# Patient Record
Sex: FEMALE | Race: WHITE | NOT HISPANIC OR LATINO | Employment: OTHER | URBAN - METROPOLITAN AREA
[De-identification: names, ages, dates, MRNs, and addresses within clinical notes are randomized per-mention and may not be internally consistent; named-entity substitution may affect disease eponyms.]

---

## 2023-05-01 DIAGNOSIS — B00.9 HERPES SIMPLEX: Primary | ICD-10-CM

## 2023-05-01 RX ORDER — VALACYCLOVIR HYDROCHLORIDE 500 MG/1
500 TABLET, FILM COATED ORAL 2 TIMES DAILY
Qty: 28 TABLET | Refills: 3 | Status: SHIPPED | OUTPATIENT
Start: 2023-05-01 | End: 2023-11-07 | Stop reason: SDUPTHER

## 2023-05-01 RX ORDER — VALACYCLOVIR HYDROCHLORIDE 500 MG/1
1 TABLET, FILM COATED ORAL 2 TIMES DAILY
COMMUNITY
Start: 2018-03-27 | End: 2023-05-01 | Stop reason: SDUPTHER

## 2023-05-12 RX ORDER — ATORVASTATIN CALCIUM 10 MG/1
1 TABLET, FILM COATED ORAL DAILY
COMMUNITY
Start: 2021-07-26 | End: 2023-06-19 | Stop reason: SDUPTHER

## 2023-05-12 RX ORDER — TRAZODONE HYDROCHLORIDE 100 MG/1
100 TABLET ORAL NIGHTLY
COMMUNITY
End: 2023-08-08

## 2023-05-12 RX ORDER — ONDANSETRON HYDROCHLORIDE 8 MG/1
8 TABLET, FILM COATED ORAL DAILY PRN
COMMUNITY
Start: 2023-02-24 | End: 2023-05-15 | Stop reason: ALTCHOICE

## 2023-05-12 RX ORDER — DOXYCYCLINE 50 MG/1
50 CAPSULE ORAL
COMMUNITY
Start: 2022-09-28 | End: 2023-05-15 | Stop reason: ALTCHOICE

## 2023-05-12 RX ORDER — ESCITALOPRAM OXALATE 10 MG/1
1 TABLET ORAL
COMMUNITY
Start: 2020-03-23 | End: 2023-05-15 | Stop reason: ALTCHOICE

## 2023-05-12 RX ORDER — BUPROPION HYDROCHLORIDE 300 MG/1
1 TABLET ORAL DAILY
COMMUNITY
Start: 2020-02-25 | End: 2023-10-16 | Stop reason: SDUPTHER

## 2023-05-12 RX ORDER — CHOLECALCIFEROL (VITAMIN D3) 50 MCG
1 TABLET ORAL DAILY
COMMUNITY
Start: 2018-06-28 | End: 2023-11-29 | Stop reason: WASHOUT

## 2023-05-12 RX ORDER — FLUOCINOLONE ACETONIDE 0.1 MG/ML
SOLUTION TOPICAL
COMMUNITY
Start: 2022-05-19 | End: 2023-09-22 | Stop reason: ALTCHOICE

## 2023-05-12 RX ORDER — KETOCONAZOLE 20 MG/ML
SHAMPOO, SUSPENSION TOPICAL
COMMUNITY
Start: 2022-05-19 | End: 2023-11-07 | Stop reason: WASHOUT

## 2023-05-15 ENCOUNTER — OFFICE VISIT (OUTPATIENT)
Dept: PRIMARY CARE | Facility: CLINIC | Age: 68
End: 2023-05-15
Payer: MEDICARE

## 2023-05-15 VITALS
DIASTOLIC BLOOD PRESSURE: 68 MMHG | RESPIRATION RATE: 18 BRPM | HEIGHT: 64 IN | WEIGHT: 149 LBS | BODY MASS INDEX: 25.44 KG/M2 | SYSTOLIC BLOOD PRESSURE: 110 MMHG | HEART RATE: 81 BPM | TEMPERATURE: 97.8 F | OXYGEN SATURATION: 97 %

## 2023-05-15 DIAGNOSIS — H61.22 IMPACTED CERUMEN OF LEFT EAR: Primary | ICD-10-CM

## 2023-05-15 PROCEDURE — 1159F MED LIST DOCD IN RCRD: CPT | Performed by: INTERNAL MEDICINE

## 2023-05-15 PROCEDURE — 99213 OFFICE O/P EST LOW 20 MIN: CPT | Performed by: INTERNAL MEDICINE

## 2023-05-15 PROCEDURE — 1036F TOBACCO NON-USER: CPT | Performed by: INTERNAL MEDICINE

## 2023-05-15 ASSESSMENT — PAIN SCALES - GENERAL: PAINLEVEL: 8

## 2023-06-14 RX ORDER — FLUOXETINE HYDROCHLORIDE 20 MG/1
2 CAPSULE ORAL DAILY
COMMUNITY
Start: 2006-03-10 | End: 2023-09-22 | Stop reason: ALTCHOICE

## 2023-06-19 ENCOUNTER — OFFICE VISIT (OUTPATIENT)
Dept: PRIMARY CARE | Facility: CLINIC | Age: 68
End: 2023-06-19
Payer: MEDICARE

## 2023-06-19 VITALS
OXYGEN SATURATION: 99 % | HEART RATE: 88 BPM | TEMPERATURE: 98 F | DIASTOLIC BLOOD PRESSURE: 70 MMHG | HEIGHT: 64 IN | RESPIRATION RATE: 16 BRPM | SYSTOLIC BLOOD PRESSURE: 150 MMHG | BODY MASS INDEX: 23.9 KG/M2 | WEIGHT: 140 LBS

## 2023-06-19 DIAGNOSIS — E78.2 MIXED HYPERLIPIDEMIA: Primary | ICD-10-CM

## 2023-06-19 DIAGNOSIS — M25.552 PAIN OF LEFT HIP: ICD-10-CM

## 2023-06-19 PROCEDURE — 99213 OFFICE O/P EST LOW 20 MIN: CPT | Performed by: INTERNAL MEDICINE

## 2023-06-19 PROCEDURE — 1036F TOBACCO NON-USER: CPT | Performed by: INTERNAL MEDICINE

## 2023-06-19 PROCEDURE — 1159F MED LIST DOCD IN RCRD: CPT | Performed by: INTERNAL MEDICINE

## 2023-06-19 RX ORDER — ATORVASTATIN CALCIUM 10 MG/1
10 TABLET, FILM COATED ORAL DAILY
Qty: 90 TABLET | Refills: 1 | Status: SHIPPED | OUTPATIENT
Start: 2023-06-19 | End: 2023-11-07 | Stop reason: SDUPTHER

## 2023-06-19 RX ORDER — ETODOLAC 200 MG/1
200 CAPSULE ORAL 2 TIMES DAILY
Qty: 60 CAPSULE | Refills: 11 | Status: SHIPPED | OUTPATIENT
Start: 2023-06-19 | End: 2023-11-30 | Stop reason: SDUPTHER

## 2023-06-19 ASSESSMENT — ENCOUNTER SYMPTOMS
OCCASIONAL FEELINGS OF UNSTEADINESS: 0
HIP PAIN: 1
LOSS OF SENSATION IN FEET: 0
DEPRESSION: 0

## 2023-06-19 ASSESSMENT — PAIN SCALES - GENERAL: PAINLEVEL: 4

## 2023-06-19 NOTE — PROGRESS NOTES
"Subjective   Patient ID: Gerda Buck is a 67 y.o. female who presents for Hip Pain (Left, lump left side).  In for left hip pain for the past 3 weeks.    Hip Pain       In for left hip pain for about 3 week.s.  Review of Systems   All other systems reviewed and are negative.      Objective   Physical Exam  /70   Pulse 88   Temp 36.7 °C (98 °F)   Resp 16   Ht 1.626 m (5' 4\")   Wt 63.5 kg (140 lb)   SpO2 99%   BMI 24.03 kg/m²         Assessment/Plan   Problem List Items Addressed This Visit    None  Visit Diagnoses       Mixed hyperlipidemia    -  Primary    Relevant Medications    atorvastatin (Lipitor) 10 mg tablet    Pain of left hip        Relevant Medications    etodolac (Lodine) 200 mg capsule    Other Relevant Orders    XR hip left 2 or 3 views          Left hip pain.  May need PT> Will follow.     "

## 2023-08-08 DIAGNOSIS — F51.01 PRIMARY INSOMNIA: Primary | ICD-10-CM

## 2023-08-08 RX ORDER — TRAZODONE HYDROCHLORIDE 100 MG/1
100 TABLET ORAL NIGHTLY
Qty: 90 TABLET | Refills: 1 | Status: SHIPPED | OUTPATIENT
Start: 2023-08-08 | End: 2023-11-07 | Stop reason: SDUPTHER

## 2023-09-19 ENCOUNTER — TELEPHONE (OUTPATIENT)
Dept: PRIMARY CARE | Facility: CLINIC | Age: 68
End: 2023-09-19
Payer: MEDICARE

## 2023-09-19 NOTE — TELEPHONE ENCOUNTER
Patient is currently in hospital , admitted yesterday evening and will be in the hospital for 24-48 more hours. She states she would like Kendy to give her a CB and explain what's going on in layman's terms because the providers there aren't helpful or really explaining anything. There is a colleague of Kendy who is currently treating her by the name of Swati Guadarrama.    [phone]

## 2023-09-21 ENCOUNTER — TELEPHONE (OUTPATIENT)
Dept: PRIMARY CARE | Facility: CLINIC | Age: 68
End: 2023-09-21
Payer: MEDICARE

## 2023-09-21 DIAGNOSIS — R45.86 MOOD CHANGE: Primary | ICD-10-CM

## 2023-09-21 RX ORDER — AMLODIPINE BESYLATE 10 MG/1
1 TABLET ORAL DAILY
COMMUNITY
Start: 2023-09-20 | End: 2023-11-07 | Stop reason: SDUPTHER

## 2023-09-21 RX ORDER — LORAZEPAM 0.5 MG/1
0.5 TABLET ORAL DAILY
Qty: 5 TABLET | Refills: 0 | Status: SHIPPED | OUTPATIENT
Start: 2023-09-21

## 2023-09-21 RX ORDER — LORAZEPAM 0.5 MG/1
0.5 TABLET ORAL DAILY
COMMUNITY
End: 2023-09-21 | Stop reason: SDUPTHER

## 2023-09-21 RX ORDER — METOPROLOL TARTRATE 50 MG/1
TABLET ORAL 2 TIMES DAILY
COMMUNITY
Start: 2023-09-20 | End: 2023-11-07 | Stop reason: SDUPTHER

## 2023-09-21 RX ORDER — ESCITALOPRAM OXALATE 10 MG/1
10 TABLET ORAL
COMMUNITY
End: 2023-10-16 | Stop reason: SDUPTHER

## 2023-09-21 RX ORDER — ACETAMINOPHEN 325 MG/1
TABLET ORAL EVERY 4 HOURS PRN
COMMUNITY
Start: 2023-09-20 | End: 2023-11-07 | Stop reason: WASHOUT

## 2023-09-21 NOTE — TELEPHONE ENCOUNTER
Patient called in asked for 5 Ativan to be sent to [facility], Advise? She also has an upcoming apt tomorrow at 9:30 am.

## 2023-09-22 ENCOUNTER — OFFICE VISIT (OUTPATIENT)
Dept: PRIMARY CARE | Facility: CLINIC | Age: 68
End: 2023-09-22
Payer: MEDICARE

## 2023-09-22 DIAGNOSIS — I49.8 OTHER CARDIAC ARRHYTHMIA: Primary | ICD-10-CM

## 2023-09-22 DIAGNOSIS — R94.31 ABNORMAL ELECTROCARDIOGRAPHY DURING EXERCISE STRESS TEST: ICD-10-CM

## 2023-09-22 PROBLEM — I49.9 ARRHYTHMIA: Status: ACTIVE | Noted: 2023-09-22

## 2023-09-22 PROCEDURE — 1125F AMNT PAIN NOTED PAIN PRSNT: CPT | Performed by: INTERNAL MEDICINE

## 2023-09-22 PROCEDURE — 99213 OFFICE O/P EST LOW 20 MIN: CPT | Performed by: INTERNAL MEDICINE

## 2023-09-22 PROCEDURE — 90662 IIV NO PRSV INCREASED AG IM: CPT | Performed by: INTERNAL MEDICINE

## 2023-09-22 PROCEDURE — G0008 ADMIN INFLUENZA VIRUS VAC: HCPCS | Performed by: INTERNAL MEDICINE

## 2023-09-22 PROCEDURE — 1159F MED LIST DOCD IN RCRD: CPT | Performed by: INTERNAL MEDICINE

## 2023-09-22 PROCEDURE — 1036F TOBACCO NON-USER: CPT | Performed by: INTERNAL MEDICINE

## 2023-09-22 ASSESSMENT — ENCOUNTER SYMPTOMS: DIZZINESS: 1

## 2023-09-22 NOTE — PROGRESS NOTES
Subjective   Patient ID: Gerda Buck is a 67 y.o. female who presents for Hospital Follow-up, Flu Vaccine, and Dizziness.  Dizziness      In for follow up for recent hospitalization  Review of Systems   Neurological:  Positive for dizziness.   All other systems reviewed and are negative.      Objective   Physical Exam  Constitutional:       General: She is not in acute distress.     Appearance: Normal appearance. She is normal weight. She is not ill-appearing, toxic-appearing or diaphoretic.   HENT:      Head: Normocephalic.      Right Ear: Tympanic membrane, ear canal and external ear normal.      Left Ear: Tympanic membrane, ear canal and external ear normal.      Nose: Nose normal.      Mouth/Throat:      Mouth: Mucous membranes are moist.      Pharynx: Oropharynx is clear.   Eyes:      Extraocular Movements: Extraocular movements intact.      Conjunctiva/sclera: Conjunctivae normal.      Pupils: Pupils are equal, round, and reactive to light.   Cardiovascular:      Rate and Rhythm: Normal rate.      Heart sounds: No murmur heard.     No friction rub. No gallop.   Pulmonary:      Effort: Pulmonary effort is normal. No respiratory distress.      Breath sounds: Normal breath sounds. No wheezing or rales.   Abdominal:      General: Abdomen is flat. Bowel sounds are normal. There is no distension.      Palpations: Abdomen is soft. There is no mass.      Tenderness: There is no abdominal tenderness. There is no guarding or rebound.      Hernia: No hernia is present.   Musculoskeletal:         General: No swelling, tenderness, deformity or signs of injury.      Cervical back: Normal range of motion.      Right lower leg: No edema.      Left lower leg: No edema.   Skin:     General: Skin is warm and dry.   Neurological:      General: No focal deficit present.      Mental Status: She is alert. Mental status is at baseline. She is disoriented.   Psychiatric:         Mood and Affect: Mood normal.         Behavior:  Behavior normal.       There were no vitals taken for this visit.        Assessment/Plan   Problem List Items Addressed This Visit       Arrhythmia - Primary    Relevant Medications    metoprolol tartrate (Lopressor) 50 mg tablet    amLODIPine (Norvasc) 10 mg tablet   Stress Echo  Will follow. Call if there are any    Issues.

## 2023-09-22 NOTE — PROGRESS NOTES
Patient is here to f/up from hospital for tachycardia, currently has a monitor on chest for 14 days to monitor pulse, Has some concerns   No Pain   No Rx refills needed  Received HD Flu vaccine within [facility] in office

## 2023-09-25 ENCOUNTER — PATIENT OUTREACH (OUTPATIENT)
Dept: CARE COORDINATION | Facility: CLINIC | Age: 68
End: 2023-09-25
Payer: MEDICARE

## 2023-09-25 NOTE — PROGRESS NOTES
Follow up KELSIE completed by in patient nursing.   Has follow up with Dr. Larry on 9/28/2023.   Will outreach to patient after visit.   Patient had Medicare.

## 2023-09-28 ENCOUNTER — APPOINTMENT (OUTPATIENT)
Dept: PRIMARY CARE | Facility: CLINIC | Age: 68
End: 2023-09-28
Payer: MEDICARE

## 2023-10-04 ENCOUNTER — DOCUMENTATION (OUTPATIENT)
Dept: ADMINISTRATIVE | Facility: CLINIC | Age: 68
End: 2023-10-04
Payer: MEDICARE

## 2023-10-13 ENCOUNTER — TELEPHONE (OUTPATIENT)
Dept: PRIMARY CARE | Facility: CLINIC | Age: 68
End: 2023-10-13
Payer: MEDICARE

## 2023-10-16 DIAGNOSIS — F32.9 MAJOR DEPRESSIVE DISORDER, REMISSION STATUS UNSPECIFIED, UNSPECIFIED WHETHER RECURRENT: ICD-10-CM

## 2023-10-16 DIAGNOSIS — F41.9 ANXIETY: ICD-10-CM

## 2023-10-16 RX ORDER — ESCITALOPRAM OXALATE 10 MG/1
10 TABLET ORAL
Qty: 30 TABLET | Refills: 0 | Status: SHIPPED | OUTPATIENT
Start: 2023-10-16 | End: 2023-11-07 | Stop reason: SDUPTHER

## 2023-10-16 RX ORDER — BUPROPION HYDROCHLORIDE 150 MG/1
150 TABLET ORAL EVERY MORNING
Qty: 30 TABLET | Refills: 0 | Status: SHIPPED | OUTPATIENT
Start: 2023-10-16 | End: 2023-11-07 | Stop reason: WASHOUT

## 2023-10-16 RX ORDER — BUPROPION HYDROCHLORIDE 300 MG/1
300 TABLET ORAL DAILY
Qty: 30 TABLET | Refills: 0 | Status: SHIPPED | OUTPATIENT
Start: 2023-10-16 | End: 2023-11-07 | Stop reason: SDUPTHER

## 2023-10-26 ENCOUNTER — HOSPITAL ENCOUNTER (OUTPATIENT)
Dept: CARDIOLOGY | Facility: HOSPITAL | Age: 68
Discharge: HOME | End: 2023-10-26
Payer: MEDICARE

## 2023-10-26 DIAGNOSIS — R94.31 ABNORMAL ELECTROCARDIOGRAPHY DURING EXERCISE STRESS TEST: ICD-10-CM

## 2023-10-26 DIAGNOSIS — I49.8 OTHER CARDIAC ARRHYTHMIA: ICD-10-CM

## 2023-10-26 PROCEDURE — 93018 CV STRESS TEST I&R ONLY: CPT | Performed by: INTERNAL MEDICINE

## 2023-10-26 PROCEDURE — 93016 CV STRESS TEST SUPVJ ONLY: CPT | Performed by: INTERNAL MEDICINE

## 2023-10-26 PROCEDURE — 93350 STRESS TTE ONLY: CPT | Performed by: INTERNAL MEDICINE

## 2023-10-26 PROCEDURE — 93017 CV STRESS TEST TRACING ONLY: CPT

## 2023-11-07 DIAGNOSIS — I49.8 OTHER CARDIAC ARRHYTHMIA: Primary | ICD-10-CM

## 2023-11-07 DIAGNOSIS — F41.9 ANXIETY: ICD-10-CM

## 2023-11-07 DIAGNOSIS — E78.2 MIXED HYPERLIPIDEMIA: ICD-10-CM

## 2023-11-07 DIAGNOSIS — F51.01 PRIMARY INSOMNIA: ICD-10-CM

## 2023-11-07 DIAGNOSIS — B00.9 HERPES SIMPLEX: ICD-10-CM

## 2023-11-07 DIAGNOSIS — F32.9 MAJOR DEPRESSIVE DISORDER, REMISSION STATUS UNSPECIFIED, UNSPECIFIED WHETHER RECURRENT: ICD-10-CM

## 2023-11-07 RX ORDER — TRAZODONE HYDROCHLORIDE 100 MG/1
100 TABLET ORAL NIGHTLY
Qty: 90 TABLET | Refills: 1 | Status: SHIPPED | OUTPATIENT
Start: 2023-11-07 | End: 2024-06-05

## 2023-11-07 RX ORDER — METOPROLOL TARTRATE 50 MG/1
50 TABLET ORAL 2 TIMES DAILY
Qty: 180 TABLET | Refills: 1 | Status: SHIPPED | OUTPATIENT
Start: 2023-11-07 | End: 2024-06-05

## 2023-11-07 RX ORDER — BUPROPION HYDROCHLORIDE 300 MG/1
300 TABLET ORAL DAILY
Qty: 90 TABLET | Refills: 1 | Status: SHIPPED | OUTPATIENT
Start: 2023-11-07 | End: 2024-06-05

## 2023-11-07 RX ORDER — VALACYCLOVIR HYDROCHLORIDE 500 MG/1
500 TABLET, FILM COATED ORAL 2 TIMES DAILY
Qty: 28 TABLET | Refills: 3 | Status: SHIPPED | OUTPATIENT
Start: 2023-11-07

## 2023-11-07 RX ORDER — AMLODIPINE BESYLATE 10 MG/1
10 TABLET ORAL DAILY
Qty: 90 TABLET | Refills: 1 | Status: SHIPPED | OUTPATIENT
Start: 2023-11-07 | End: 2024-06-05

## 2023-11-07 RX ORDER — ESCITALOPRAM OXALATE 10 MG/1
10 TABLET ORAL
Qty: 90 TABLET | Refills: 1 | Status: SHIPPED | OUTPATIENT
Start: 2023-11-07 | End: 2024-06-05

## 2023-11-07 RX ORDER — ATORVASTATIN CALCIUM 10 MG/1
10 TABLET, FILM COATED ORAL DAILY
Qty: 90 TABLET | Refills: 1 | Status: SHIPPED | OUTPATIENT
Start: 2023-11-07 | End: 2024-06-05

## 2023-11-23 ENCOUNTER — APPOINTMENT (OUTPATIENT)
Dept: ORTHOPEDIC SURGERY | Facility: CLINIC | Age: 68
End: 2023-11-23
Payer: MEDICARE

## 2023-11-29 ENCOUNTER — APPOINTMENT (OUTPATIENT)
Dept: ORTHOPEDIC SURGERY | Facility: HOSPITAL | Age: 68
End: 2023-11-29
Payer: MEDICARE

## 2023-11-29 ENCOUNTER — TELEPHONE (OUTPATIENT)
Dept: PRIMARY CARE | Facility: CLINIC | Age: 68
End: 2023-11-29
Payer: MEDICARE

## 2023-11-29 NOTE — TELEPHONE ENCOUNTER
Patient called in has neck and shoulder pains, this is a symptom of not taking the medication, tried to get in with chiropractor, tried OTC medication patches, asking for a few pain medications until she is seen 12/01/2023, Advise?

## 2023-11-30 DIAGNOSIS — M25.552 PAIN OF LEFT HIP: ICD-10-CM

## 2023-11-30 RX ORDER — ETODOLAC 200 MG/1
200 CAPSULE ORAL 2 TIMES DAILY
Qty: 7 CAPSULE | Refills: 1 | Status: SHIPPED | OUTPATIENT
Start: 2023-11-30 | End: 2023-12-01 | Stop reason: SDUPTHER

## 2023-12-01 ENCOUNTER — OFFICE VISIT (OUTPATIENT)
Dept: PRIMARY CARE | Facility: CLINIC | Age: 68
End: 2023-12-01
Payer: MEDICARE

## 2023-12-01 VITALS
TEMPERATURE: 97.3 F | RESPIRATION RATE: 16 BRPM | BODY MASS INDEX: 23.22 KG/M2 | DIASTOLIC BLOOD PRESSURE: 58 MMHG | WEIGHT: 136 LBS | SYSTOLIC BLOOD PRESSURE: 102 MMHG | HEART RATE: 61 BPM | OXYGEN SATURATION: 99 % | HEIGHT: 64 IN

## 2023-12-01 DIAGNOSIS — Z12.31 ENCOUNTER FOR SCREENING MAMMOGRAM FOR MALIGNANT NEOPLASM OF BREAST: ICD-10-CM

## 2023-12-01 DIAGNOSIS — M81.8 OTHER OSTEOPOROSIS WITHOUT CURRENT PATHOLOGICAL FRACTURE: ICD-10-CM

## 2023-12-01 DIAGNOSIS — M25.552 PAIN OF LEFT HIP: ICD-10-CM

## 2023-12-01 DIAGNOSIS — K58.9: ICD-10-CM

## 2023-12-01 DIAGNOSIS — Z00.00 WELL ADULT EXAM: Primary | ICD-10-CM

## 2023-12-01 PROCEDURE — 1159F MED LIST DOCD IN RCRD: CPT | Performed by: INTERNAL MEDICINE

## 2023-12-01 PROCEDURE — 1125F AMNT PAIN NOTED PAIN PRSNT: CPT | Performed by: INTERNAL MEDICINE

## 2023-12-01 PROCEDURE — 99214 OFFICE O/P EST MOD 30 MIN: CPT | Performed by: INTERNAL MEDICINE

## 2023-12-01 PROCEDURE — 1036F TOBACCO NON-USER: CPT | Performed by: INTERNAL MEDICINE

## 2023-12-01 ASSESSMENT — PAIN SCALES - GENERAL: PAINLEVEL: 2

## 2023-12-01 NOTE — PROGRESS NOTES
"Subjective   Patient ID: Gerda Buck is a 68 y.o. female who presents for Hospital Follow-up.  Patient is for follow up for htn and also hypermotility.        Review of Systems   All other systems reviewed and are negative.      Objective   Physical Exam  /58   Pulse 61   Temp 36.3 °C (97.3 °F)   Resp 16   Ht 1.626 m (5' 4\")   Wt 61.7 kg (136 lb)   SpO2 99%   BMI 23.34 kg/m²         Assessment/Plan   Problem List Items Addressed This Visit    None  Visit Diagnoses       Well adult exam    -  Primary    Relevant Orders    XR DEXA bone density    BI mammo bilateral screening tomosynthesis    Varicella Zoster Antibody, IgG    Other osteoporosis without current pathological fracture        Relevant Orders    XR DEXA bone density    Encounter for screening mammogram for malignant neoplasm of breast        Relevant Orders    BI mammo bilateral screening tomosynthesis    Hypermobile colon        Relevant Orders    Referral to Gastroenterology               "

## 2023-12-04 RX ORDER — ETODOLAC 200 MG/1
200 CAPSULE ORAL 2 TIMES DAILY
Qty: 14 CAPSULE | Refills: 1 | Status: SHIPPED | OUTPATIENT
Start: 2023-12-04 | End: 2023-12-18

## 2024-01-08 ENCOUNTER — TELEPHONE (OUTPATIENT)
Dept: PRIMARY CARE | Facility: CLINIC | Age: 69
End: 2024-01-08
Payer: MEDICARE

## 2024-01-08 DIAGNOSIS — M54.9 BACK PAIN, UNSPECIFIED BACK LOCATION, UNSPECIFIED BACK PAIN LATERALITY, UNSPECIFIED CHRONICITY: Primary | ICD-10-CM

## 2024-01-08 RX ORDER — ETODOLAC 200 MG/1
200 CAPSULE ORAL 2 TIMES DAILY
COMMUNITY
End: 2024-01-08 | Stop reason: SDUPTHER

## 2024-01-09 RX ORDER — ETODOLAC 200 MG/1
200 CAPSULE ORAL 2 TIMES DAILY
Qty: 14 CAPSULE | Refills: 0 | Status: SHIPPED | OUTPATIENT
Start: 2024-01-09

## 2024-01-24 ENCOUNTER — OFFICE VISIT (OUTPATIENT)
Dept: GASTROENTEROLOGY | Facility: CLINIC | Age: 69
End: 2024-01-24
Payer: MEDICARE

## 2024-01-24 VITALS — BODY MASS INDEX: 23.39 KG/M2 | WEIGHT: 137 LBS | HEIGHT: 64 IN | HEART RATE: 80 BPM

## 2024-01-24 DIAGNOSIS — K58.9: ICD-10-CM

## 2024-01-24 DIAGNOSIS — K64.8 OTHER HEMORRHOIDS: Primary | ICD-10-CM

## 2024-01-24 DIAGNOSIS — K62.89 RECTAL PAIN: ICD-10-CM

## 2024-01-24 DIAGNOSIS — K64.9 HEMORRHOIDS, UNSPECIFIED HEMORRHOID TYPE: ICD-10-CM

## 2024-01-24 DIAGNOSIS — R19.4 CHANGE IN BOWEL HABIT: ICD-10-CM

## 2024-01-24 PROCEDURE — 1125F AMNT PAIN NOTED PAIN PRSNT: CPT | Performed by: INTERNAL MEDICINE

## 2024-01-24 PROCEDURE — 1036F TOBACCO NON-USER: CPT | Performed by: INTERNAL MEDICINE

## 2024-01-24 PROCEDURE — 99204 OFFICE O/P NEW MOD 45 MIN: CPT | Performed by: INTERNAL MEDICINE

## 2024-01-24 RX ORDER — HYDROCORTISONE ACETATE 25 MG/1
25 SUPPOSITORY RECTAL 2 TIMES DAILY
Qty: 28 SUPPOSITORY | Refills: 0 | Status: SHIPPED | OUTPATIENT
Start: 2024-01-24 | End: 2024-02-07

## 2024-01-24 ASSESSMENT — ENCOUNTER SYMPTOMS: SHORTNESS OF BREATH: 0

## 2024-01-24 NOTE — PROGRESS NOTES
REASON FOR VISIT:  Hemorrhoids, change in bowel habits  PCP (requesting provider): Juan M Larry MD.    HPI:  Gerda Buck is a 68 y.o. female with a past medical history of HTN, HLD, and osteoporosis being evaluated for hemorrhoids and change in bowel habits.    The patient notes that starting around Ramy she has been struggling with post-prandial fecal urgency but then alternating constipation.  She notes her diet changes the color of her stool.  She has had some change in caliber of her stool. She also notes issues with hemorrhoids.  She has rectal pain. Rare bleeding. She has a BM every other day. If she does go daily then only small amount. She takes 2 fiber chewies per day. She also sounds like she has been taking Miralax.  Her last colonoscopy was in 2019. Weight is stable. No abdominal pain.    PSurgHx: No abdominal surgeries     FamHx: No GI malignancy    Prior Endoscopy:  -Colonoscopy (8/2019): Good prep, medium sized IH, otherwise normal colon.  -Colonoscopy (4/2009): Good prep, normal colon.    PAST MEDICAL HISTORY  Past Medical History:   Diagnosis Date    Other conditions influencing health status     Arthritis    Personal history of other infectious and parasitic diseases     H/O cold sores       PAST SURGICAL HISTORY  Past Surgical History:   Procedure Laterality Date    ANKLE SURGERY  12/27/2013    Ankle Surgery    FOOT SURGERY  12/27/2013    Foot Surgery       FAMILY HISTORY  No family history on file.    SOCIAL HISTORY   reports that she has never smoked. She has never used smokeless tobacco. She reports current alcohol use. No history on file for drug use.    REVIEW OF SYSTEMS  Review of Systems   Respiratory:  Negative for shortness of breath.    Cardiovascular:  Negative for chest pain.   All other systems reviewed and are negative.    A 10+ point review of systems was otherwise negative except as noted and per HPI.    ALLERGIES  Allergies   Allergen Reactions    Other Other        MEDICATIONS  Current Outpatient Medications   Medication Instructions    amLODIPine (NORVASC) 10 mg, oral, Daily    atorvastatin (LIPITOR) 10 mg, oral, Daily    buPROPion XL (WELLBUTRIN XL) 300 mg, oral, Daily    escitalopram (LEXAPRO) 10 mg, oral, Daily RT    etodolac (LODINE) 200 mg, oral, 2 times daily    LORazepam (ATIVAN) 0.5 mg, oral, Daily    metoprolol tartrate (LOPRESSOR) 50 mg, oral, 2 times daily    traZODone (DESYREL) 100 mg, oral, Nightly    valACYclovir (VALTREX) 500 mg, oral, 2 times daily       VITALS  There were no vitals filed for this visit.   There is no height or weight on file to calculate BMI.    PHYSICAL EXAM  CONSTITUTIONAL: NAD, appears stated age  EYES: anicteric sclera, sclera clear  HEAD: normocephalic, atraumatic   NECK: supple   PULMONARY: CTAB  CARDIOVASCULAR: RRR, no M/R/G appreciated   ABDOMEN: soft, NTND, +BS, no rebound or guarding   MUSCULOSKELETAL: no edema  SKIN: no jaundice   PSYCHIATRIC: AOx3, appropriate insight and judgement    LABS  WBC (x10E9/L)   Date Value   09/20/2023 5.9   09/18/2023 8.5   05/18/2022 3.6 (L)     Hemoglobin (g/dL)   Date Value   09/20/2023 12.0   09/18/2023 11.7 (L)   05/18/2022 12.6     Platelets (x10E9/L)   Date Value   09/20/2023 241   09/18/2023 304   05/18/2022 234     Sodium (mmol/L)   Date Value   09/20/2023 132 (L)   09/19/2023 132 (L)   09/18/2023 132 (L)     Potassium (mmol/L)   Date Value   09/20/2023 3.3 (L)   09/19/2023 3.6   09/18/2023 3.4 (L)     Chloride (mmol/L)   Date Value   09/20/2023 98   09/19/2023 95 (L)   09/18/2023 95 (L)     Bicarbonate (mmol/L)   Date Value   09/20/2023 23   09/19/2023 24   09/18/2023 20 (L)     Urea Nitrogen (mg/dL)   Date Value   09/20/2023 9   09/19/2023 8   09/18/2023 15     Creatinine (mg/dL)   Date Value   09/20/2023 0.70   09/19/2023 0.61   09/18/2023 0.57     Calcium (mg/dL)   Date Value   09/20/2023 8.5 (L)   09/19/2023 9.1   09/18/2023 8.8     Total Protein (g/dL)   Date Value   09/18/2023 6.4  "  04/01/2019 6.9     Total Bilirubin (mg/dL)   Date Value   09/18/2023 1.7 (H)   04/01/2019 1.0     Alkaline Phosphatase (U/L)   Date Value   09/18/2023 86   04/01/2019 80     ALT (SGPT) (U/L)   Date Value   09/18/2023 24   05/18/2022 15   07/20/2021 12     AST (U/L)   Date Value   09/18/2023 44 (H)   04/01/2019 21     Glucose (mg/dL)   Date Value   09/20/2023 125 (H)   09/19/2023 98   09/18/2023 146 (H)     No results found for: \"LIPASE\", \"CRP\"    ASSESSMENT/PLAN  Gerda Buck is a 68 y.o. female with a past medical history of HTN, HLD, and osteoporosis being evaluated for change in bowel habits and intermittent issues with hemorrhoids. Colonoscopy showed medium sized hemorrhoids (8/2019). Patient with change in bowel habits with some fecal urgency on occasion but then also constipation. She has been taking Miralax and I think a better option would be Metamucil to bulk and even out her bowel habits.  We will also plan a course of steroid suppositories for her hemorrhoids.  If there is no improvement in the next few weeks, then we will plan an updated colonoscopy.    -Use Hydrocortisone suppository 25 mg BID x 14 days  -Stop Miralax and use Metamucil once daily and increase to BID after 1 week PRN  -If no improvement with the above, then advised the patient to call the office and schedule a colonoscopy     Follow-up in the office PRN.    Signature: Doug Nguyen MD  "

## 2024-01-24 NOTE — PATIENT INSTRUCTIONS
Stop Miralax and the gummies.    Use steroid suppository twice daily for 14 days to help with the hemorrhoids.    Use Metamucil powder 1 heaping tablespoonful mixed with 8 ounces of juice or water once daily for 1 week.  Increase to twice daily thereafter as needed.  If no improvement in 3-4 weeks, then call the office and we will schedule a colonoscopy.    Follow-up in the office as needed.

## 2024-02-16 ENCOUNTER — TELEPHONE (OUTPATIENT)
Dept: GASTROENTEROLOGY | Facility: CLINIC | Age: 69
End: 2024-02-16
Payer: MEDICARE

## 2024-02-16 DIAGNOSIS — K64.8 OTHER HEMORRHOIDS: Primary | ICD-10-CM

## 2024-02-16 RX ORDER — HYDROCORTISONE 25 MG/G
CREAM TOPICAL 2 TIMES DAILY
Qty: 23 G | Refills: 0 | Status: SHIPPED | OUTPATIENT
Start: 2024-02-16 | End: 2024-02-22 | Stop reason: SDUPTHER

## 2024-02-16 NOTE — TELEPHONE ENCOUNTER
Gerda Newberry called stating her hemorrhoids are still bleeding. She would like to know if something can be called in for her?     Please advise     Thank You

## 2024-02-22 DIAGNOSIS — K64.8 OTHER HEMORRHOIDS: ICD-10-CM

## 2024-02-22 RX ORDER — HYDROCORTISONE 25 MG/G
CREAM TOPICAL 2 TIMES DAILY
Qty: 30 G | Refills: 1 | Status: SHIPPED | OUTPATIENT
Start: 2024-02-22 | End: 2024-03-07

## 2024-02-27 ENCOUNTER — TELEPHONE (OUTPATIENT)
Dept: GASTROENTEROLOGY | Facility: CLINIC | Age: 69
End: 2024-02-27
Payer: MEDICARE

## 2024-02-27 DIAGNOSIS — R19.4 CHANGE IN BOWEL HABITS: Primary | ICD-10-CM

## 2024-02-27 DIAGNOSIS — R19.4 CHANGE IN STOOL HABITS: ICD-10-CM

## 2024-02-27 RX ORDER — POLYETHYLENE GLYCOL 3350, SODIUM SULFATE ANHYDROUS, SODIUM BICARBONATE, SODIUM CHLORIDE, POTASSIUM CHLORIDE 236; 22.74; 6.74; 5.86; 2.97 G/4L; G/4L; G/4L; G/4L; G/4L
4 POWDER, FOR SOLUTION ORAL ONCE
Qty: 4000 ML | Refills: 0 | Status: SHIPPED | OUTPATIENT
Start: 2024-02-27 | End: 2024-02-27

## 2024-02-28 RX ORDER — POLYETHYLENE GLYCOL 3350, SODIUM SULFATE ANHYDROUS, SODIUM BICARBONATE, SODIUM CHLORIDE, POTASSIUM CHLORIDE 236; 22.74; 6.74; 5.86; 2.97 G/4L; G/4L; G/4L; G/4L; G/4L
4000 POWDER, FOR SOLUTION ORAL ONCE
Qty: 4000 ML | Refills: 0 | Status: SHIPPED | OUTPATIENT
Start: 2024-02-28 | End: 2024-02-28

## 2024-03-12 ENCOUNTER — OFFICE VISIT (OUTPATIENT)
Dept: GASTROENTEROLOGY | Facility: EXTERNAL LOCATION | Age: 69
End: 2024-03-12
Payer: MEDICARE

## 2024-03-12 DIAGNOSIS — R19.4 CHANGE IN BOWEL HABITS: ICD-10-CM

## 2024-03-12 DIAGNOSIS — K57.30 DIVERTICULOSIS OF LARGE INTESTINE WITHOUT DIVERTICULITIS: ICD-10-CM

## 2024-03-12 DIAGNOSIS — K62.5 HEMORRHAGE OF ANUS AND RECTUM: Primary | ICD-10-CM

## 2024-03-12 DIAGNOSIS — K64.4 RESIDUAL HEMORRHOIDAL SKIN TAGS: ICD-10-CM

## 2024-03-12 PROCEDURE — 45378 DIAGNOSTIC COLONOSCOPY: CPT | Performed by: INTERNAL MEDICINE

## 2024-03-12 PROCEDURE — 1036F TOBACCO NON-USER: CPT | Performed by: INTERNAL MEDICINE

## 2024-03-12 PROCEDURE — 1125F AMNT PAIN NOTED PAIN PRSNT: CPT | Performed by: INTERNAL MEDICINE

## 2024-03-15 ENCOUNTER — APPOINTMENT (OUTPATIENT)
Dept: SURGERY | Facility: CLINIC | Age: 69
End: 2024-03-15
Payer: MEDICARE

## 2024-06-05 DIAGNOSIS — F51.01 PRIMARY INSOMNIA: ICD-10-CM

## 2024-06-05 DIAGNOSIS — I49.8 OTHER CARDIAC ARRHYTHMIA: ICD-10-CM

## 2024-06-05 DIAGNOSIS — F32.9 MAJOR DEPRESSIVE DISORDER, REMISSION STATUS UNSPECIFIED, UNSPECIFIED WHETHER RECURRENT: ICD-10-CM

## 2024-06-05 DIAGNOSIS — E78.2 MIXED HYPERLIPIDEMIA: ICD-10-CM

## 2024-06-05 DIAGNOSIS — F41.9 ANXIETY: ICD-10-CM

## 2024-06-05 RX ORDER — ESCITALOPRAM OXALATE 10 MG/1
10 TABLET ORAL
Qty: 90 TABLET | Refills: 1 | Status: SHIPPED | OUTPATIENT
Start: 2024-06-05 | End: 2024-12-02

## 2024-06-05 RX ORDER — ATORVASTATIN CALCIUM 10 MG/1
10 TABLET, FILM COATED ORAL DAILY
Qty: 90 TABLET | Refills: 1 | Status: SHIPPED | OUTPATIENT
Start: 2024-06-05

## 2024-06-05 RX ORDER — TRAZODONE HYDROCHLORIDE 100 MG/1
100 TABLET ORAL NIGHTLY
Qty: 90 TABLET | Refills: 1 | Status: SHIPPED | OUTPATIENT
Start: 2024-06-05

## 2024-06-05 RX ORDER — BUPROPION HYDROCHLORIDE 300 MG/1
300 TABLET ORAL DAILY
Qty: 90 TABLET | Refills: 1 | Status: SHIPPED | OUTPATIENT
Start: 2024-06-05 | End: 2024-12-02

## 2024-06-05 RX ORDER — METOPROLOL TARTRATE 50 MG/1
50 TABLET ORAL 2 TIMES DAILY
Qty: 180 TABLET | Refills: 1 | Status: SHIPPED | OUTPATIENT
Start: 2024-06-05 | End: 2024-12-02

## 2024-06-05 RX ORDER — AMLODIPINE BESYLATE 10 MG/1
10 TABLET ORAL DAILY
Qty: 90 TABLET | Refills: 1 | Status: SHIPPED | OUTPATIENT
Start: 2024-06-05 | End: 2024-12-02

## 2024-08-23 ENCOUNTER — OFFICE VISIT (OUTPATIENT)
Dept: SURGERY | Facility: CLINIC | Age: 69
End: 2024-08-23
Payer: MEDICARE

## 2024-08-23 VITALS
BODY MASS INDEX: 23 KG/M2 | TEMPERATURE: 97.3 F | WEIGHT: 134 LBS | DIASTOLIC BLOOD PRESSURE: 68 MMHG | HEART RATE: 80 BPM | SYSTOLIC BLOOD PRESSURE: 104 MMHG

## 2024-08-23 DIAGNOSIS — K64.8 INFLAMED INTERNAL HEMORRHOID: Primary | ICD-10-CM

## 2024-08-23 DIAGNOSIS — K64.4 ANAL SKIN TAG: ICD-10-CM

## 2024-08-23 PROCEDURE — 46600 DIAGNOSTIC ANOSCOPY SPX: CPT | Performed by: NURSE PRACTITIONER

## 2024-08-23 PROCEDURE — 99213 OFFICE O/P EST LOW 20 MIN: CPT | Performed by: NURSE PRACTITIONER

## 2024-08-23 RX ORDER — HYDROCORTISONE ACETATE 25 MG/1
25 SUPPOSITORY RECTAL 2 TIMES DAILY
Qty: 28 SUPPOSITORY | Refills: 0 | Status: SHIPPED | OUTPATIENT
Start: 2024-08-23 | End: 2024-09-06

## 2024-08-23 ASSESSMENT — PAIN SCALES - GENERAL: PAINLEVEL: 4

## 2024-08-23 NOTE — PROGRESS NOTES
History Of Present Illness  Gerda Buck is a 68 y.o. female presenting with an external hemorrhoid.     She has an external hemorrhoid that causes a lot of pain with sitting.  It will bleed off and on.      She has been taking a fiber supplement and will have 1-2  soft-hard BM's every day with straining.  She can sit long on the toilet to have a BM.  No c/o any accidents or leakage of stool.      Colonoscopy 3/2024    No hx of any perianal surgeries.  No hx of any vaginal births.        Past Medical History  She has a past medical history of Other conditions influencing health status and Personal history of other infectious and parasitic diseases.    Surgical History  She has a past surgical history that includes Foot surgery (12/27/2013) and Ankle surgery (12/27/2013).     Social History  She reports that she has never smoked. She has never used smokeless tobacco. She reports current alcohol use. No history on file for drug use.    Family History  No family history on file.     Allergies  Other    Review of Systems   All other systems reviewed and are negative.       Physical Exam  Constitutional:       Appearance: Normal appearance.   HENT:      Head: Normocephalic and atraumatic.   Pulmonary:      Effort: Pulmonary effort is normal.   Musculoskeletal:         General: Normal range of motion.   Skin:     General: Skin is warm and dry.   Neurological:      General: No focal deficit present.      Mental Status: She is alert and oriented to person, place, and time.   Psychiatric:         Mood and Affect: Mood normal.         Behavior: Behavior normal.       Anoscopy    Date/Time: 8/23/2024 9:30 AM    Performed by: CHAPITO Rosario  Authorized by: CHAPITO Rosario    Consent:     Consent obtained:  Verbal    Consent given by:  Patient    Risks, benefits, and alternatives were discussed: yes    Universal protocol:     Procedure explained and questions answered to patient or proxy's  satisfaction: yes      Patient identity confirmed:  Verbally with patient  Post-procedure details:     Procedure completion:  Tolerated  Comments:      She has a large anal skin tag in the anterior midline.  No pain or active bleeding.  Good tone on MAAME.  On anoscopy, looking in 360 degrees, she has irritation of the internal hemorrhoids more in the anterior midline.         Last Recorded Vitals  /68   Pulse 80   Temp 36.3 °C (97.3 °F)   Wt 60.8 kg (134 lb)        Assessment/Plan   Gerda has inflamed internal hemorrhoids and a large external anal skin tag in the anterior midline.  She will start taking Hydrocortisone suppositories BID for 2 weeks.  She will continue to increase her fiber and water intake.  We talked about surgical removal of the external hemorrhoid in the near future and she would like to do that next year.  I will have her see Dr. Guy in December for a possible surgery in the new year.  She will call with any issues.       Sis Veliz, APRN-CNP

## 2024-08-24 PROBLEM — K64.8 INFLAMED INTERNAL HEMORRHOID: Status: ACTIVE | Noted: 2024-08-24

## 2024-08-24 PROBLEM — K64.4 ANAL SKIN TAG: Status: ACTIVE | Noted: 2024-08-24

## 2024-08-29 DIAGNOSIS — K64.8 INFLAMED INTERNAL HEMORRHOID: Primary | ICD-10-CM

## 2024-08-29 RX ORDER — HYDROCORTISONE ACETATE 25 MG/1
25 SUPPOSITORY RECTAL 2 TIMES DAILY
Qty: 28 SUPPOSITORY | Refills: 0 | Status: SHIPPED | OUTPATIENT
Start: 2024-08-29 | End: 2024-09-12

## 2024-09-13 DIAGNOSIS — K64.8 INFLAMED INTERNAL HEMORRHOID: Primary | ICD-10-CM

## 2024-09-13 RX ORDER — HYDROCORTISONE ACETATE 25 MG/1
25 SUPPOSITORY RECTAL 2 TIMES DAILY
Qty: 28 SUPPOSITORY | Refills: 0 | Status: SHIPPED | OUTPATIENT
Start: 2024-09-13 | End: 2024-09-27

## 2024-09-13 RX ORDER — IBUPROFEN 800 MG/1
800 TABLET ORAL EVERY 8 HOURS PRN
Qty: 30 TABLET | Refills: 0 | Status: SHIPPED | OUTPATIENT
Start: 2024-09-13 | End: 2024-09-23

## 2024-09-13 NOTE — PROGRESS NOTES
She is still having hemorrhoidal pain but the hydrocortisone suppositories are helping.  She will continue to use the suppositories BID for 1-2 weeks and will use Motrin prn for any discomfort.

## 2024-09-16 DIAGNOSIS — B00.9 HERPES SIMPLEX: ICD-10-CM

## 2024-09-17 RX ORDER — VALACYCLOVIR HYDROCHLORIDE 500 MG/1
500 TABLET, FILM COATED ORAL 2 TIMES DAILY
Qty: 28 TABLET | Refills: 6 | Status: SHIPPED | OUTPATIENT
Start: 2024-09-17

## 2024-10-07 ENCOUNTER — HOSPITAL ENCOUNTER (OUTPATIENT)
Dept: RADIOLOGY | Facility: CLINIC | Age: 69
End: 2024-10-07
Payer: MEDICARE

## 2024-11-06 NOTE — PROGRESS NOTES
No chief complaint on file.      History Of Present Illness  Gerda Buck is a 69 y.o. female presenting with hemorrhoids.    Subjective   Gerda Buck was referred by Sis Veliz CNP  for evaluation of pain. She was seen by Sis 8/24 and had inflamed internal hemorrhoids and a large external anal skin tag. She was given hydrocortisone suppositories and presents today to discuss removal of the skin tag.     She has pain with defecation. She takes tylenol and uses hydrocortisone for the pain.     Pain: Yes - even with sitting, with Bms   Protruding Tissue: Yes  Bleeding: Yes - on TP     Bowel habits:  Moves bowels daily  Stool is soft with fiber; spends 5-6 minutes on the toilet to have a BM  Has blood on TP.     Dietary history:   64oz water; eats some fruit/veg/whole grains. At least 2+ servings per day.       Colonoscopy 3/2024 ([facility]): Hemorrhoids on perianal exam. The examined portion of the ileum was normal. Mild diverticulosis in the sigmoid colon. Medium sized external hemorrhoids. The exam was otherwise normal. Repeat in 10 years.     Non-smoker/No ETOH/No Illicit drug use  PMH: HTN, HLD, osteoporosis  PSH:  Allergies:   No family history of CRC or IBD  Employment:       Past Medical History  Past Medical History:   Diagnosis Date    Other conditions influencing health status     Arthritis    Personal history of other infectious and parasitic diseases     H/O cold sores       Surgical History  Past Surgical History:   Procedure Laterality Date    ANKLE SURGERY  12/27/2013    Ankle Surgery    FOOT SURGERY  12/27/2013    Foot Surgery        Social History  She reports that she has never smoked. She has never used smokeless tobacco. She reports current alcohol use. No history on file for drug use.    Family History  No family history on file.     Allergies  Other    Home Medications  Prior to Admission medications    Medication Sig Start Date End Date Taking? Authorizing Provider    amLODIPine (Norvasc) 10 mg tablet Take 1 tablet (10 mg) by mouth once daily. 6/5/24 12/2/24  Juan M Larry MD   atorvastatin (Lipitor) 10 mg tablet Take 1 tablet (10 mg) by mouth once daily. 6/5/24   Juan M Larry MD   buPROPion XL (Wellbutrin XL) 300 mg 24 hr tablet Take 1 tablet (300 mg) by mouth once daily. 6/5/24 12/2/24  Juan M Larry MD   escitalopram (Lexapro) 10 mg tablet Take 1 tablet (10 mg) by mouth once daily. 6/5/24 12/2/24  Juan M Larry MD   etodolac (Lodine) 200 mg capsule Take 1 capsule (200 mg) by mouth 2 times a day. 1/9/24   Juan M Larry MD   hydrocortisone (Anusol-HC) 2.5 % rectal cream Insert into the rectum 2 times a day for 14 days. 2/22/24 3/7/24  Doug Nguyen MD   LORazepam (Ativan) 0.5 mg tablet Take 1 tablet (0.5 mg) by mouth once daily. 9/21/23   Juan M Larry MD   metoprolol tartrate (Lopressor) 50 mg tablet Take 1 tablet by mouth 2 times a day. 6/5/24 12/2/24  Juan M Larry MD   traZODone (Desyrel) 100 mg tablet Take 1 tablet (100 mg) by mouth once daily at bedtime. 6/5/24   Juan M Larry MD   valACYclovir (Valtrex) 500 mg tablet Take 1 tablet (500 mg) by mouth 2 times a day. 9/17/24   Juan M Larry MD       Review of Systems     Physical Exam    Perineal/Rectal Exam  Perineum: Anterior midline hemorrhoidal skin tag   MAAME: WNL  Tone: WNL    Anoscopy    Date/Time: 11/8/2024 9:56 AM    Performed by: Divina Solorzano MD  Authorized by: Divina Solorzano MD    Consent:     Consent obtained:  Verbal    Consent given by:  Patient    Risks, benefits, and alternatives were discussed: yes      Risks discussed:  Bleeding and pain    Alternatives discussed:  No treatment  Universal protocol:     Procedure explained and questions answered to patient or proxy's satisfaction: yes      Patient identity confirmed:  Verbally with patient  Indications:     Indications: rectal pain    Procedure details:     Internal hemorrhoids: yes    Post-procedure  details:     Procedure completion:  Tolerated well, no immediate complications  Mildly inflamed internal hemorrhoids, not enlarged. Anterior prolapsing component attached to anterior skin tag.   Levator ani tenderness, particularly on R  Normal tone/good coordination.        Last Recorded Vitals  There were no vitals taken for this visit.    Relevant Results          Assessment/Plan   The diagnosis, pathophysiology, treatment and alternatives were discussed in detail. The patient is a candidate for operative hemorrhoidectomy to address the skin tag/prolapsing tissue. I discussed the procedure, options, preparation, perioperative course, risks and possible outcomes in detail. Specific risks discussed included, but were not limited to, bleeding, infection, wound separation, thrombosis, fistula, sphincter injury and incontinence, pain, spasm, constipation, urinary retention, and the general risks of anesthesia. We discussed the need for maintaining normal bowel habits to prevent fecal impaction in the short term, and then to prevent recurrence in the long term.   Periop preparation, perioperative course, need for a ride home, pain management, recovery and return to work/full activity were discussed. All questions were answered. She is going to think about it and call  With regards to levator ani tenderness, The anatomy, pathophysiology, treatment goals and alternatives, risks and benefits were discussed in detail.  I explained that this is a functional disorder and that treatment is directed at the symptoms.   Options include Nifedipine ointment, heat, anti-spasmodic medications, PT/pelvic floor massage, local injection (steroids, analgesics, botox, etc, per pain management). Patient education materials were provided. I recommend nifedipine, heat, and PT.

## 2024-11-08 ENCOUNTER — OFFICE VISIT (OUTPATIENT)
Dept: SURGERY | Facility: CLINIC | Age: 69
End: 2024-11-08
Payer: MEDICARE

## 2024-11-08 VITALS — DIASTOLIC BLOOD PRESSURE: 64 MMHG | HEART RATE: 68 BPM | SYSTOLIC BLOOD PRESSURE: 103 MMHG

## 2024-11-08 DIAGNOSIS — K62.89 ANAL PAIN: ICD-10-CM

## 2024-11-08 PROCEDURE — 46600 DIAGNOSTIC ANOSCOPY SPX: CPT | Performed by: SURGERY

## 2024-11-08 PROCEDURE — 99215 OFFICE O/P EST HI 40 MIN: CPT | Mod: 24 | Performed by: SURGERY

## 2024-11-08 ASSESSMENT — ENCOUNTER SYMPTOMS: RECTAL PAIN: 1

## 2025-01-13 ENCOUNTER — APPOINTMENT (OUTPATIENT)
Dept: SURGERY | Facility: CLINIC | Age: 70
End: 2025-01-13
Payer: MEDICARE

## 2025-02-02 ENCOUNTER — APPOINTMENT (OUTPATIENT)
Dept: RADIOLOGY | Facility: HOSPITAL | Age: 70
End: 2025-02-02
Payer: MEDICARE

## 2025-02-02 ENCOUNTER — APPOINTMENT (OUTPATIENT)
Dept: CARDIOLOGY | Facility: HOSPITAL | Age: 70
End: 2025-02-02
Payer: MEDICARE

## 2025-02-02 ENCOUNTER — HOSPITAL ENCOUNTER (OUTPATIENT)
Facility: HOSPITAL | Age: 70
Setting detail: OBSERVATION
End: 2025-02-02
Attending: EMERGENCY MEDICINE | Admitting: INTERNAL MEDICINE
Payer: MEDICARE

## 2025-02-02 VITALS
HEIGHT: 64 IN | RESPIRATION RATE: 16 BRPM | TEMPERATURE: 98.4 F | DIASTOLIC BLOOD PRESSURE: 59 MMHG | WEIGHT: 135 LBS | BODY MASS INDEX: 23.05 KG/M2 | HEART RATE: 94 BPM | OXYGEN SATURATION: 97 % | SYSTOLIC BLOOD PRESSURE: 132 MMHG

## 2025-02-02 DIAGNOSIS — E87.20 METABOLIC ACIDOSIS: Primary | ICD-10-CM

## 2025-02-02 DIAGNOSIS — R55 NEAR SYNCOPE: ICD-10-CM

## 2025-02-02 PROBLEM — E55.9 VITAMIN D DEFICIENCY: Status: ACTIVE | Noted: 2025-02-02

## 2025-02-02 PROBLEM — K64.8 INFLAMED INTERNAL HEMORRHOID: Status: RESOLVED | Noted: 2024-08-24 | Resolved: 2025-02-02

## 2025-02-02 PROBLEM — G25.71 AKATHISIA: Status: ACTIVE | Noted: 2025-02-02

## 2025-02-02 PROBLEM — K64.4 ANAL SKIN TAG: Status: RESOLVED | Noted: 2024-08-24 | Resolved: 2025-02-02

## 2025-02-02 PROBLEM — F33.8 SEASONAL AFFECTIVE DISORDER (CMS-HCC): Status: ACTIVE | Noted: 2025-02-02

## 2025-02-02 PROBLEM — Z00.00 WELL ADULT EXAM: Status: RESOLVED | Noted: 2023-12-01 | Resolved: 2025-02-02

## 2025-02-02 PROBLEM — K58.9: Status: RESOLVED | Noted: 2023-12-01 | Resolved: 2025-02-02

## 2025-02-02 PROBLEM — I49.9 ARRHYTHMIA: Status: RESOLVED | Noted: 2023-09-22 | Resolved: 2025-02-02

## 2025-02-02 LAB
ALBUMIN SERPL BCP-MCNC: 3.8 G/DL (ref 3.4–5)
ALBUMIN SERPL BCP-MCNC: 4.4 G/DL (ref 3.4–5)
ALP SERPL-CCNC: 101 U/L (ref 33–136)
ALP SERPL-CCNC: 87 U/L (ref 33–136)
ALT SERPL W P-5'-P-CCNC: 27 U/L (ref 7–45)
ALT SERPL W P-5'-P-CCNC: 30 U/L (ref 7–45)
AMPHETAMINES UR QL SCN: NORMAL
ANION GAP BLDV CALCULATED.4IONS-SCNC: 24 MMOL/L (ref 10–25)
ANION GAP SERPL CALC-SCNC: 24 MMOL/L (ref 10–20)
ANION GAP SERPL CALC-SCNC: 30 MMOL/L (ref 10–20)
APPEARANCE UR: CLEAR
APTT PPP: 39 SECONDS (ref 27–38)
AST SERPL W P-5'-P-CCNC: 66 U/L (ref 9–39)
AST SERPL W P-5'-P-CCNC: 85 U/L (ref 9–39)
BARBITURATES UR QL SCN: NORMAL
BASE EXCESS BLDV CALC-SCNC: -12.3 MMOL/L (ref -2–3)
BASOPHILS # BLD AUTO: 0.03 X10*3/UL (ref 0–0.1)
BASOPHILS NFR BLD AUTO: 0.6 %
BENZODIAZ UR QL SCN: NORMAL
BILIRUB SERPL-MCNC: 1.8 MG/DL (ref 0–1.2)
BILIRUB SERPL-MCNC: 1.8 MG/DL (ref 0–1.2)
BILIRUB UR STRIP.AUTO-MCNC: NEGATIVE MG/DL
BODY TEMPERATURE: 37 DEGREES CELSIUS
BUN SERPL-MCNC: 24 MG/DL (ref 6–23)
BUN SERPL-MCNC: 25 MG/DL (ref 6–23)
BZE UR QL SCN: NORMAL
CA-I BLDV-SCNC: 1.11 MMOL/L (ref 1.1–1.33)
CALCIUM SERPL-MCNC: 8.1 MG/DL (ref 8.6–10.3)
CALCIUM SERPL-MCNC: 9.2 MG/DL (ref 8.6–10.3)
CANNABINOIDS UR QL SCN: NORMAL
CARDIAC TROPONIN I PNL SERPL HS: 6 NG/L (ref 0–13)
CARDIAC TROPONIN I PNL SERPL HS: 6 NG/L (ref 0–13)
CHLORIDE BLDV-SCNC: 98 MMOL/L (ref 98–107)
CHLORIDE SERPL-SCNC: 100 MMOL/L (ref 98–107)
CHLORIDE SERPL-SCNC: 97 MMOL/L (ref 98–107)
CK SERPL-CCNC: 52 U/L (ref 0–215)
CO2 SERPL-SCNC: 13 MMOL/L (ref 21–32)
CO2 SERPL-SCNC: 15 MMOL/L (ref 21–32)
COLOR UR: ABNORMAL
CREAT SERPL-MCNC: 0.72 MG/DL (ref 0.5–1.05)
CREAT SERPL-MCNC: 0.75 MG/DL (ref 0.5–1.05)
CRP SERPL-MCNC: 0.4 MG/DL
EGFRCR SERPLBLD CKD-EPI 2021: 87 ML/MIN/1.73M*2
EGFRCR SERPLBLD CKD-EPI 2021: >90 ML/MIN/1.73M*2
EOSINOPHIL # BLD AUTO: 0.01 X10*3/UL (ref 0–0.7)
EOSINOPHIL NFR BLD AUTO: 0.2 %
ERYTHROCYTE [DISTWIDTH] IN BLOOD BY AUTOMATED COUNT: 14.1 % (ref 11.5–14.5)
FENTANYL+NORFENTANYL UR QL SCN: NORMAL
GLUCOSE BLD MANUAL STRIP-MCNC: 147 MG/DL (ref 74–99)
GLUCOSE BLD MANUAL STRIP-MCNC: 202 MG/DL (ref 74–99)
GLUCOSE BLD MANUAL STRIP-MCNC: 227 MG/DL (ref 74–99)
GLUCOSE BLD MANUAL STRIP-MCNC: 60 MG/DL (ref 74–99)
GLUCOSE BLD MANUAL STRIP-MCNC: 79 MG/DL (ref 74–99)
GLUCOSE BLDV-MCNC: 134 MG/DL (ref 74–99)
GLUCOSE SERPL-MCNC: 130 MG/DL (ref 74–99)
GLUCOSE SERPL-MCNC: 54 MG/DL (ref 74–99)
GLUCOSE UR STRIP.AUTO-MCNC: NORMAL MG/DL
HCO3 BLDV-SCNC: 13.3 MMOL/L (ref 22–26)
HCT VFR BLD AUTO: 38.5 % (ref 36–46)
HCT VFR BLD EST: 35 % (ref 36–46)
HGB BLD-MCNC: 13.2 G/DL (ref 12–16)
HGB BLDV-MCNC: 11.6 G/DL (ref 12–16)
HOLD SPECIMEN: NORMAL
IMM GRANULOCYTES # BLD AUTO: 0.02 X10*3/UL (ref 0–0.7)
IMM GRANULOCYTES NFR BLD AUTO: 0.4 % (ref 0–0.9)
INHALED O2 CONCENTRATION: 21 %
INR PPP: 0.9 (ref 0.9–1.1)
KETONES UR STRIP.AUTO-MCNC: ABNORMAL MG/DL
LACTATE BLDV-SCNC: 2 MMOL/L (ref 0.4–2)
LDH SERPL L TO P-CCNC: 157 U/L (ref 84–246)
LEUKOCYTE ESTERASE UR QL STRIP.AUTO: ABNORMAL
LYMPHOCYTES # BLD AUTO: 1.33 X10*3/UL (ref 1.2–4.8)
LYMPHOCYTES NFR BLD AUTO: 25 %
MAGNESIUM SERPL-MCNC: 1.47 MG/DL (ref 1.6–2.4)
MCH RBC QN AUTO: 34.2 PG (ref 26–34)
MCHC RBC AUTO-ENTMCNC: 34.3 G/DL (ref 32–36)
MCV RBC AUTO: 100 FL (ref 80–100)
METHADONE UR QL SCN: NORMAL
MONOCYTES # BLD AUTO: 0.16 X10*3/UL (ref 0.1–1)
MONOCYTES NFR BLD AUTO: 3 %
MUCOUS THREADS #/AREA URNS AUTO: NORMAL /LPF
NEUTROPHILS # BLD AUTO: 3.78 X10*3/UL (ref 1.2–7.7)
NEUTROPHILS NFR BLD AUTO: 70.8 %
NITRITE UR QL STRIP.AUTO: NEGATIVE
NRBC BLD-RTO: 0 /100 WBCS (ref 0–0)
OPIATES UR QL SCN: NORMAL
OXYCODONE+OXYMORPHONE UR QL SCN: NORMAL
OXYHGB MFR BLDV: 92.2 % (ref 45–75)
PCO2 BLDV: 29 MM HG (ref 41–51)
PCP UR QL SCN: NORMAL
PH BLDV: 7.27 PH (ref 7.33–7.43)
PH UR STRIP.AUTO: 5.5 [PH]
PLATELET # BLD AUTO: 355 X10*3/UL (ref 150–450)
PO2 BLDV: 70 MM HG (ref 35–45)
POTASSIUM BLDV-SCNC: 3.9 MMOL/L (ref 3.5–5.3)
POTASSIUM SERPL-SCNC: 3.5 MMOL/L (ref 3.5–5.3)
POTASSIUM SERPL-SCNC: 3.7 MMOL/L (ref 3.5–5.3)
PROT SERPL-MCNC: 5.8 G/DL (ref 6.4–8.2)
PROT SERPL-MCNC: 7.4 G/DL (ref 6.4–8.2)
PROT UR STRIP.AUTO-MCNC: ABNORMAL MG/DL
PROTHROMBIN TIME: 10.4 SECONDS (ref 9.8–12.8)
RBC # BLD AUTO: 3.86 X10*6/UL (ref 4–5.2)
RBC # UR STRIP.AUTO: NEGATIVE /UL
RBC #/AREA URNS AUTO: NORMAL /HPF
SAO2 % BLDV: 94 % (ref 45–75)
SODIUM BLDV-SCNC: 131 MMOL/L (ref 136–145)
SODIUM SERPL-SCNC: 135 MMOL/L (ref 136–145)
SODIUM SERPL-SCNC: 136 MMOL/L (ref 136–145)
SP GR UR STRIP.AUTO: 1.02
SQUAMOUS #/AREA URNS AUTO: NORMAL /HPF
T4 FREE SERPL-MCNC: 0.81 NG/DL (ref 0.61–1.12)
TSH SERPL-ACNC: 0.43 MIU/L (ref 0.44–3.98)
UROBILINOGEN UR STRIP.AUTO-MCNC: NORMAL MG/DL
WBC # BLD AUTO: 5.3 X10*3/UL (ref 4.4–11.3)
WBC #/AREA URNS AUTO: NORMAL /HPF

## 2025-02-02 PROCEDURE — 83615 LACTATE (LD) (LDH) ENZYME: CPT | Performed by: INTERNAL MEDICINE

## 2025-02-02 PROCEDURE — 99222 1ST HOSP IP/OBS MODERATE 55: CPT

## 2025-02-02 PROCEDURE — 2500000004 HC RX 250 GENERAL PHARMACY W/ HCPCS (ALT 636 FOR OP/ED): Performed by: EMERGENCY MEDICINE

## 2025-02-02 PROCEDURE — 82550 ASSAY OF CK (CPK): CPT | Performed by: INTERNAL MEDICINE

## 2025-02-02 PROCEDURE — 82435 ASSAY OF BLOOD CHLORIDE: CPT | Performed by: EMERGENCY MEDICINE

## 2025-02-02 PROCEDURE — 80307 DRUG TEST PRSMV CHEM ANLYZR: CPT | Performed by: INTERNAL MEDICINE

## 2025-02-02 PROCEDURE — 83735 ASSAY OF MAGNESIUM: CPT | Performed by: INTERNAL MEDICINE

## 2025-02-02 PROCEDURE — 84443 ASSAY THYROID STIM HORMONE: CPT

## 2025-02-02 PROCEDURE — 82435 ASSAY OF BLOOD CHLORIDE: CPT | Performed by: INTERNAL MEDICINE

## 2025-02-02 PROCEDURE — 2500000001 HC RX 250 WO HCPCS SELF ADMINISTERED DRUGS (ALT 637 FOR MEDICARE OP): Performed by: NURSE PRACTITIONER

## 2025-02-02 PROCEDURE — 36415 COLL VENOUS BLD VENIPUNCTURE: CPT | Performed by: EMERGENCY MEDICINE

## 2025-02-02 PROCEDURE — G0378 HOSPITAL OBSERVATION PER HR: HCPCS

## 2025-02-02 PROCEDURE — 80053 COMPREHEN METABOLIC PANEL: CPT | Performed by: EMERGENCY MEDICINE

## 2025-02-02 PROCEDURE — 84484 ASSAY OF TROPONIN QUANT: CPT | Performed by: EMERGENCY MEDICINE

## 2025-02-02 PROCEDURE — 96365 THER/PROPH/DIAG IV INF INIT: CPT

## 2025-02-02 PROCEDURE — 99285 EMERGENCY DEPT VISIT HI MDM: CPT | Performed by: EMERGENCY MEDICINE

## 2025-02-02 PROCEDURE — 96361 HYDRATE IV INFUSION ADD-ON: CPT

## 2025-02-02 PROCEDURE — 2500000004 HC RX 250 GENERAL PHARMACY W/ HCPCS (ALT 636 FOR OP/ED): Performed by: INTERNAL MEDICINE

## 2025-02-02 PROCEDURE — 71045 X-RAY EXAM CHEST 1 VIEW: CPT | Mod: FOREIGN READ | Performed by: RADIOLOGY

## 2025-02-02 PROCEDURE — 82947 ASSAY GLUCOSE BLOOD QUANT: CPT

## 2025-02-02 PROCEDURE — 85730 THROMBOPLASTIN TIME PARTIAL: CPT | Performed by: EMERGENCY MEDICINE

## 2025-02-02 PROCEDURE — 85025 COMPLETE CBC W/AUTO DIFF WBC: CPT | Performed by: EMERGENCY MEDICINE

## 2025-02-02 PROCEDURE — 86140 C-REACTIVE PROTEIN: CPT | Performed by: INTERNAL MEDICINE

## 2025-02-02 PROCEDURE — 81001 URINALYSIS AUTO W/SCOPE: CPT | Performed by: EMERGENCY MEDICINE

## 2025-02-02 PROCEDURE — 2500000001 HC RX 250 WO HCPCS SELF ADMINISTERED DRUGS (ALT 637 FOR MEDICARE OP): Performed by: INTERNAL MEDICINE

## 2025-02-02 PROCEDURE — 87086 URINE CULTURE/COLONY COUNT: CPT | Mod: AHULAB | Performed by: EMERGENCY MEDICINE

## 2025-02-02 PROCEDURE — 84439 ASSAY OF FREE THYROXINE: CPT

## 2025-02-02 PROCEDURE — 96366 THER/PROPH/DIAG IV INF ADDON: CPT

## 2025-02-02 PROCEDURE — 85610 PROTHROMBIN TIME: CPT | Performed by: EMERGENCY MEDICINE

## 2025-02-02 PROCEDURE — 71045 X-RAY EXAM CHEST 1 VIEW: CPT

## 2025-02-02 PROCEDURE — 93005 ELECTROCARDIOGRAM TRACING: CPT

## 2025-02-02 RX ORDER — DEXTROSE, SODIUM CHLORIDE, SODIUM LACTATE, POTASSIUM CHLORIDE, AND CALCIUM CHLORIDE 5; .6; .31; .03; .02 G/100ML; G/100ML; G/100ML; G/100ML; G/100ML
125 INJECTION, SOLUTION INTRAVENOUS CONTINUOUS
Status: DISCONTINUED | OUTPATIENT
Start: 2025-02-02 | End: 2025-02-03 | Stop reason: HOSPADM

## 2025-02-02 RX ORDER — MAGNESIUM SULFATE HEPTAHYDRATE 40 MG/ML
2 INJECTION, SOLUTION INTRAVENOUS ONCE
Status: COMPLETED | OUTPATIENT
Start: 2025-02-02 | End: 2025-02-02

## 2025-02-02 RX ORDER — BUPROPION HYDROCHLORIDE 150 MG/1
300 TABLET ORAL DAILY
Status: DISCONTINUED | OUTPATIENT
Start: 2025-02-03 | End: 2025-02-03 | Stop reason: HOSPADM

## 2025-02-02 RX ORDER — ACETAMINOPHEN 325 MG/1
650 TABLET ORAL EVERY 6 HOURS PRN
Status: DISCONTINUED | OUTPATIENT
Start: 2025-02-02 | End: 2025-02-03 | Stop reason: HOSPADM

## 2025-02-02 RX ORDER — POLYETHYLENE GLYCOL 3350 17 G/17G
17 POWDER, FOR SOLUTION ORAL DAILY PRN
Status: DISCONTINUED | OUTPATIENT
Start: 2025-02-02 | End: 2025-02-03 | Stop reason: HOSPADM

## 2025-02-02 RX ORDER — TRAZODONE HYDROCHLORIDE 50 MG/1
100 TABLET ORAL NIGHTLY PRN
Status: DISCONTINUED | OUTPATIENT
Start: 2025-02-02 | End: 2025-02-03 | Stop reason: HOSPADM

## 2025-02-02 RX ORDER — METOPROLOL TARTRATE 50 MG/1
50 TABLET ORAL 2 TIMES DAILY
Status: DISCONTINUED | OUTPATIENT
Start: 2025-02-02 | End: 2025-02-03 | Stop reason: HOSPADM

## 2025-02-02 RX ORDER — AMLODIPINE BESYLATE 10 MG/1
10 TABLET ORAL DAILY
Status: DISCONTINUED | OUTPATIENT
Start: 2025-02-03 | End: 2025-02-03 | Stop reason: HOSPADM

## 2025-02-02 RX ORDER — ATORVASTATIN CALCIUM 10 MG/1
10 TABLET, FILM COATED ORAL NIGHTLY
Status: DISCONTINUED | OUTPATIENT
Start: 2025-02-02 | End: 2025-02-03 | Stop reason: HOSPADM

## 2025-02-02 RX ORDER — ETODOLAC 200 MG/1
200 CAPSULE ORAL 2 TIMES DAILY
Status: DISCONTINUED | OUTPATIENT
Start: 2025-02-02 | End: 2025-02-02

## 2025-02-02 RX ORDER — ESCITALOPRAM OXALATE 10 MG/1
10 TABLET ORAL DAILY
Status: DISCONTINUED | OUTPATIENT
Start: 2025-02-03 | End: 2025-02-03 | Stop reason: HOSPADM

## 2025-02-02 RX ORDER — POTASSIUM CHLORIDE 1.5 G/1.58G
20 POWDER, FOR SOLUTION ORAL ONCE
Status: COMPLETED | OUTPATIENT
Start: 2025-02-02 | End: 2025-02-02

## 2025-02-02 RX ORDER — HYDROCORTISONE 25 MG/G
CREAM TOPICAL 2 TIMES DAILY PRN
Status: DISCONTINUED | OUTPATIENT
Start: 2025-02-02 | End: 2025-02-03 | Stop reason: HOSPADM

## 2025-02-02 RX ORDER — ONDANSETRON HYDROCHLORIDE 2 MG/ML
4 INJECTION, SOLUTION INTRAVENOUS EVERY 8 HOURS PRN
Status: DISCONTINUED | OUTPATIENT
Start: 2025-02-02 | End: 2025-02-03 | Stop reason: HOSPADM

## 2025-02-02 RX ORDER — ENOXAPARIN SODIUM 100 MG/ML
40 INJECTION SUBCUTANEOUS EVERY 24 HOURS
Status: DISCONTINUED | OUTPATIENT
Start: 2025-02-02 | End: 2025-02-03 | Stop reason: HOSPADM

## 2025-02-02 RX ADMIN — MAGNESIUM SULFATE HEPTAHYDRATE 2 G: 40 INJECTION, SOLUTION INTRAVENOUS at 17:45

## 2025-02-02 RX ADMIN — METOPROLOL TARTRATE 50 MG: 50 TABLET, FILM COATED ORAL at 20:23

## 2025-02-02 RX ADMIN — SODIUM CHLORIDE, SODIUM LACTATE, POTASSIUM CHLORIDE, CALCIUM CHLORIDE AND DEXTROSE MONOHYDRATE 125 ML/HR: 5; 600; 310; 30; 20 INJECTION, SOLUTION INTRAVENOUS at 17:45

## 2025-02-02 RX ADMIN — SODIUM CHLORIDE 1000 ML: 9 INJECTION, SOLUTION INTRAVENOUS at 11:47

## 2025-02-02 RX ADMIN — TRAZODONE HYDROCHLORIDE 100 MG: 50 TABLET ORAL at 23:07

## 2025-02-02 RX ADMIN — ATORVASTATIN CALCIUM 10 MG: 10 TABLET, FILM COATED ORAL at 20:23

## 2025-02-02 RX ADMIN — POTASSIUM CHLORIDE 20 MEQ: 1.5 POWDER, FOR SOLUTION ORAL at 17:45

## 2025-02-02 SDOH — SOCIAL STABILITY: SOCIAL INSECURITY: ARE YOU OR HAVE YOU BEEN THREATENED OR ABUSED PHYSICALLY, EMOTIONALLY, OR SEXUALLY BY ANYONE?: NO

## 2025-02-02 SDOH — ECONOMIC STABILITY: FOOD INSECURITY: WITHIN THE PAST 12 MONTHS, THE FOOD YOU BOUGHT JUST DIDN'T LAST AND YOU DIDN'T HAVE MONEY TO GET MORE.: NEVER TRUE

## 2025-02-02 SDOH — ECONOMIC STABILITY: HOUSING INSECURITY: AT ANY TIME IN THE PAST 12 MONTHS, WERE YOU HOMELESS OR LIVING IN A SHELTER (INCLUDING NOW)?: NO

## 2025-02-02 SDOH — ECONOMIC STABILITY: FOOD INSECURITY: WITHIN THE PAST 12 MONTHS, YOU WORRIED THAT YOUR FOOD WOULD RUN OUT BEFORE YOU GOT THE MONEY TO BUY MORE.: NEVER TRUE

## 2025-02-02 SDOH — SOCIAL STABILITY: SOCIAL INSECURITY: WITHIN THE LAST YEAR, HAVE YOU BEEN AFRAID OF YOUR PARTNER OR EX-PARTNER?: NO

## 2025-02-02 SDOH — SOCIAL STABILITY: SOCIAL INSECURITY: WERE YOU ABLE TO COMPLETE ALL THE BEHAVIORAL HEALTH SCREENINGS?: YES

## 2025-02-02 SDOH — SOCIAL STABILITY: SOCIAL INSECURITY
WITHIN THE LAST YEAR, HAVE YOU BEEN KICKED, HIT, SLAPPED, OR OTHERWISE PHYSICALLY HURT BY YOUR PARTNER OR EX-PARTNER?: NO

## 2025-02-02 SDOH — SOCIAL STABILITY: SOCIAL INSECURITY: HAS ANYONE EVER THREATENED TO HURT YOUR FAMILY OR YOUR PETS?: NO

## 2025-02-02 SDOH — ECONOMIC STABILITY: INCOME INSECURITY: IN THE PAST 12 MONTHS HAS THE ELECTRIC, GAS, OIL, OR WATER COMPANY THREATENED TO SHUT OFF SERVICES IN YOUR HOME?: NO

## 2025-02-02 SDOH — SOCIAL STABILITY: SOCIAL INSECURITY
WITHIN THE LAST YEAR, HAVE YOU BEEN RAPED OR FORCED TO HAVE ANY KIND OF SEXUAL ACTIVITY BY YOUR PARTNER OR EX-PARTNER?: NO

## 2025-02-02 SDOH — SOCIAL STABILITY: SOCIAL INSECURITY: WITHIN THE LAST YEAR, HAVE YOU BEEN HUMILIATED OR EMOTIONALLY ABUSED IN OTHER WAYS BY YOUR PARTNER OR EX-PARTNER?: NO

## 2025-02-02 SDOH — SOCIAL STABILITY: SOCIAL INSECURITY: HAVE YOU HAD ANY THOUGHTS OF HARMING ANYONE ELSE?: NO

## 2025-02-02 SDOH — SOCIAL STABILITY: SOCIAL INSECURITY: ARE THERE ANY APPARENT SIGNS OF INJURIES/BEHAVIORS THAT COULD BE RELATED TO ABUSE/NEGLECT?: NO

## 2025-02-02 SDOH — SOCIAL STABILITY: SOCIAL INSECURITY: DO YOU FEEL ANYONE HAS EXPLOITED OR TAKEN ADVANTAGE OF YOU FINANCIALLY OR OF YOUR PERSONAL PROPERTY?: NO

## 2025-02-02 SDOH — SOCIAL STABILITY: SOCIAL INSECURITY: HAVE YOU HAD THOUGHTS OF HARMING ANYONE ELSE?: NO

## 2025-02-02 SDOH — SOCIAL STABILITY: SOCIAL INSECURITY: DO YOU FEEL UNSAFE GOING BACK TO THE PLACE WHERE YOU ARE LIVING?: NO

## 2025-02-02 SDOH — SOCIAL STABILITY: SOCIAL INSECURITY: DOES ANYONE TRY TO KEEP YOU FROM HAVING/CONTACTING OTHER FRIENDS OR DOING THINGS OUTSIDE YOUR HOME?: NO

## 2025-02-02 SDOH — SOCIAL STABILITY: SOCIAL INSECURITY: ABUSE: ADULT

## 2025-02-02 ASSESSMENT — COGNITIVE AND FUNCTIONAL STATUS - GENERAL
PATIENT BASELINE BEDBOUND: NO
DAILY ACTIVITIY SCORE: 24
MOBILITY SCORE: 24
DAILY ACTIVITIY SCORE: 24
MOBILITY SCORE: 24

## 2025-02-02 ASSESSMENT — ACTIVITIES OF DAILY LIVING (ADL)
TOILETING: INDEPENDENT
LACK_OF_TRANSPORTATION: NO
BATHING: INDEPENDENT
HEARING - RIGHT EAR: FUNCTIONAL
DRESSING YOURSELF: INDEPENDENT
ADEQUATE_TO_COMPLETE_ADL: YES
FEEDING YOURSELF: INDEPENDENT
JUDGMENT_ADEQUATE_SAFELY_COMPLETE_DAILY_ACTIVITIES: YES
WALKS IN HOME: INDEPENDENT
PATIENT'S MEMORY ADEQUATE TO SAFELY COMPLETE DAILY ACTIVITIES?: YES
GROOMING: INDEPENDENT
HEARING - LEFT EAR: FUNCTIONAL

## 2025-02-02 ASSESSMENT — LIFESTYLE VARIABLES
SKIP TO QUESTIONS 9-10: 1
HOW MANY STANDARD DRINKS CONTAINING ALCOHOL DO YOU HAVE ON A TYPICAL DAY: 1 OR 2
HOW OFTEN DO YOU HAVE 6 OR MORE DRINKS ON ONE OCCASION: NEVER
AUDIT-C TOTAL SCORE: 2
AUDIT-C TOTAL SCORE: 2
HOW OFTEN DO YOU HAVE A DRINK CONTAINING ALCOHOL: 2-4 TIMES A MONTH

## 2025-02-02 ASSESSMENT — COLUMBIA-SUICIDE SEVERITY RATING SCALE - C-SSRS
1. IN THE PAST MONTH, HAVE YOU WISHED YOU WERE DEAD OR WISHED YOU COULD GO TO SLEEP AND NOT WAKE UP?: NO
6. HAVE YOU EVER DONE ANYTHING, STARTED TO DO ANYTHING, OR PREPARED TO DO ANYTHING TO END YOUR LIFE?: NO
2. HAVE YOU ACTUALLY HAD ANY THOUGHTS OF KILLING YOURSELF?: NO

## 2025-02-02 ASSESSMENT — PATIENT HEALTH QUESTIONNAIRE - PHQ9
SUM OF ALL RESPONSES TO PHQ9 QUESTIONS 1 & 2: 0
1. LITTLE INTEREST OR PLEASURE IN DOING THINGS: NOT AT ALL
2. FEELING DOWN, DEPRESSED OR HOPELESS: NOT AT ALL

## 2025-02-02 ASSESSMENT — PAIN - FUNCTIONAL ASSESSMENT: PAIN_FUNCTIONAL_ASSESSMENT: 0-10

## 2025-02-02 ASSESSMENT — PAIN SCALES - GENERAL
PAINLEVEL_OUTOF10: 0 - NO PAIN
PAINLEVEL_OUTOF10: 0 - NO PAIN

## 2025-02-02 NOTE — ED PROVIDER NOTES
HPI   Chief Complaint   Patient presents with    Dizziness    Shortness of Breath       HPI: 69-year-old female arrives with a near syncopal event she felt like she was going to pass out she became diaphoretic she is noted to be demonstrating a heart rate in 97 /75 respirations 18 and pulse ox 99%.  EKG read by me reviewed by me normal sinus rhythm normal axis good R wave progression.  Labs were noted to have hypoglycemia improved with oral intake a bicarb of 13 patient had been here a number of hours received IV fluids and then we did a venous full panel demonstrating a slightly decreased pH of 7.27 correlating with a metabolic acidosis and actually a low CO2.  We have no source of sepsis she has a normal white count normal hemoglobin hematocrit and platelets her urine had negative nitrates with just trace leukocytes.  Patient at this time will benefit from further observation and care.  Patient had a glass of wine from a reputable company this was not homemade wine.  X 1 glass yesterday.  None of her friends are ill.  She does use Toujeo for weight loss her last injection was approximately 2 weeks ago.  She has not used salicylates ethylene glycol she is not hyperglycemic she is not in kidney failure I have no other cause for her metabolic acidosis other than her weight loss to injectables.  At any rate she is very stable vital signs have been normal her entire stay but without an explanation for her metabolic acidosis the observation team was kind enough to except for observation continue IV fluids and further evaluation of her near syncope on the monitoring.  Her EKG read by me reviewed by me is normal sinus rhythm 81 bpm normal axis good R wave progression.      PMH:    SH Tobacco Alcohol  FH Heart disease Diabetes  ROS  General Appears in distress  HEENT: No sore throat, No Visual Loss, No Headache, No Ear Pain  Neck: Denies neck pain  Chest: No chest pain, no pleuritic pain, no chest wall  injury  Pulmonary: No SOB, No Cough, No Sputum production, No Wheezing  GI: No abdominal pain, no nausea or vomiting, no diarrhea.  : No dysuria, no frequency, no hematuria.  Extremities: No musculoskeletal pain, normal ambulation, no paresthesia.  Psych: Normal interaction, no anxiety, no depression, no suicidal ideation  Skin: No rashes    ROS is otherwise negative    PE: General: Appears in distress positive near syncope        HEENT: Throat is moist without exudate, midline uvula dentate intact, Tms clear with normal anatomy.        Neck: Supple non tender        Chest CTA, good AE, no wheezing, rales, or rhonci        CVA: RRR S1S2 no S3S4 or murmur        ABD: W/S/NT no HSM, no pulsatile masses, good bowel sounds        Extremities: Excellent distal pulses, brisk capillary refill. Full ROM        Psych: Normal interactions with no signs of depression  or suicidal ideation.        Neuro: Alert and oriented, moves all and feels all.    MDM:69-year-old female arrives with a near syncopal event she felt like she was going to pass out she became diaphoretic she is noted to be demonstrating a heart rate in 97 /75 respirations 18 and pulse ox 99%.  EKG read by me reviewed by me normal sinus rhythm normal axis good R wave progression.  Labs were noted to have hypoglycemia improved with oral intake a bicarb of 13 patient had been here a number of hours received IV fluids and then we did a venous full panel demonstrating a slightly decreased pH of 7.27 correlating with a metabolic acidosis and actually a low CO2.  We have no source of sepsis she has a normal white count normal hemoglobin hematocrit and platelets her urine had negative nitrates with just trace leukocytes.  Patient at this time will benefit from further observation and care.  Patient had a glass of wine from a reputable company this was not homemade wine.  X 1 glass yesterday.  None of her friends are ill.  She does use Toujeo for weight loss her  last injection was approximately 2 weeks ago.  She has not used salicylates ethylene glycol she is not hyperglycemic she is not in kidney failure I have no other cause for her metabolic acidosis other than her weight loss to injectables.  At any rate she is very stable vital signs have been normal her entire stay but without an explanation for her metabolic acidosis the observation team was kind enough to except for observation continue IV fluids and further evaluation of her near syncope on the monitoring.  Her EKG read by me reviewed by me is normal sinus rhythm 81 bpm normal axis good R wave progression.                  Patient History   Past Medical History:   Diagnosis Date    Other conditions influencing health status     Arthritis    Personal history of other infectious and parasitic diseases     H/O cold sores     Past Surgical History:   Procedure Laterality Date    ANKLE SURGERY  12/27/2013    Ankle Surgery    FOOT SURGERY  12/27/2013    Foot Surgery     No family history on file.  Social History     Tobacco Use    Smoking status: Never    Smokeless tobacco: Never   Substance Use Topics    Alcohol use: Yes     Comment: socially    Drug use: Not on file       Physical Exam   ED Triage Vitals [02/02/25 1039]   Temperature Heart Rate Respirations BP   35.9 °C (96.6 °F) 85 20 133/64      Pulse Ox Temp Source Heart Rate Source Patient Position   100 % Temporal Monitor Lying      BP Location FiO2 (%)     Right arm --       Physical Exam      ED Course & MDM   Diagnoses as of 02/02/25 1523   Metabolic acidosis   Near syncope                 No data recorded     [place] Coma Scale Score: 15 (02/02/25 1118 : Juanita Rader RN)                           Medical Decision Making      Procedure  Procedures     Mikie Monique MD  02/02/25 1557       Mikie Monique MD  02/02/25 1551

## 2025-02-02 NOTE — ED TRIAGE NOTES
PT TO ED VIA EMS FROM HOME FOR HTN AND DIZZINESS. PT STATES THAT SHE HAS BEEN FEELING HOT AND COLD. PT STATES THAT SHE BEGAN FEELING DIZZY THIS MORNING AND STATES THAT SHE FEEL NAUSEAS. PT DENIES CP BUT STATES THAT SHE FEELS A LITTLE SOB WITH ACTIVITY. PT IS A&OX4.

## 2025-02-02 NOTE — CARE PLAN
The patient's goals for the shift include      The clinical goals for the shift include remain free from injury and HDS    Problem: Safety - Adult  Goal: Free from fall injury  Outcome: Progressing

## 2025-02-02 NOTE — H&P
[facility] OBSERVATION H&P    HPI: Gerda Buck is a 69 y.o. female, with a PMH of depression, PTSD, HTN, HLD, and hemorrhoids, who presented to [facility] ED on 2/2/2025 for near syncope. Patient reports feeling like she was going to pass out today, becoming shaky and diaphoretic. She has been feeling dizzy all morning and has had some intermittent nausea. Patient denies recent fever, chills, chest pain, palpitations, leg edema, SOB, cough, abd pain, urinary sx, bloody vomit or stools, headaches, weakness, or trauma/travel/sick contacts.     ED Course: VSS, EKG nonischemic. Labs notable for hypoglycemia with blood glucose level 54, low bicarb 13, and anion gap of 30. Blood glucose improved with PO intake, received IVF in the ER. VBG then demonstrated metabolic acidosis with a PH of 7.27.  -No concern for infection with normal lactate and WBC, no anemia, UA bland  -Of note, patient is currently on ??Toujeo 2-3x per year for wt loss, dose 60 units     Patient History   PMH: She has a past medical history of Other conditions influencing health status and Personal history of other infectious and parasitic diseases.  PSH: She has a past surgical history that includes Foot surgery (12/27/2013) and Ankle surgery (12/27/2013).  SH: She reports that she has never smoked. She has never used smokeless tobacco. She reports current alcohol use.  FH: family history is not on file.     Allergies   Allergen Reactions    Other Other     Current Outpatient Medications   Medication Instructions    amLODIPine (NORVASC) 10 mg, oral, Daily    atorvastatin (LIPITOR) 10 mg, oral, Daily    buPROPion XL (WELLBUTRIN XL) 300 mg, oral, Daily    escitalopram (LEXAPRO) 10 mg, oral, Daily RT    etodolac (LODINE) 200 mg, oral, 2 times daily    hydrocortisone (Anusol-HC) 2.5 % rectal cream rectal, 2 times daily    metoprolol tartrate (LOPRESSOR) 50 mg, oral, 2 times daily    NIFEdipine (bulk) 0.2 % in ointment base Apply to the affected area  "twice daily    traZODone (DESYREL) 100 mg, oral, Nightly    valACYclovir (VALTREX) 500 mg, oral, 2 times daily     Review of Systems   ROS: 10-point review of systems was performed and is otherwise negative except as noted in HPI.        Heart Rate:  [85-97]   Temperature:  [35.9 °C (96.6 °F)-36.6 °C (97.8 °F)]   Respirations:  [16-20]   BP: (125-135)/(52-75)   Height:  [162.6 cm (5' 4\")]   Weight:  [60.8 kg (134 lb)]   Pulse Ox:  [98 %-100 %]          Vitals:    02/02/25 1039   Weight: 60.8 kg (134 lb)        Physical Exam:  Vitals and nursing notes reviewed.  GENERAL: Alert and awake, cooperative; in no acute distress  SKIN: Warm and dry, cap refill <2  HEENT: Normocephalic, PEERL, mucous membranes pink and moist  CARDIAC: Regular rate and rhythm, S1S2, no murmurs or abnormal heart sounds  CHEST: Normal respiratory effort, no abnormal breath sounds  ABDOMEN: soft, non-distended, non-tender with palpation  EXTREMITIES: No lower extremity edema, normal pulses all 4 extremities  NEURO: Alert and oriented, mental status at baseline, no focal deficits  PSYCH: Behavior and affect as expected     Medications  [START ON 2/3/2025] amLODIPine, 10 mg, oral, Daily  atorvastatin, 10 mg, oral, Nightly  [START ON 2/3/2025] buPROPion XL, 300 mg, oral, Daily  enoxaparin, 40 mg, subcutaneous, q24h  [START ON 2/3/2025] escitalopram, 10 mg, oral, Daily  etodolac, 200 mg, oral, BID  hydrocortisone, , rectal, BID  metoprolol tartrate, 50 mg, oral, BID     PRN medications: acetaminophen, ondansetron, polyethylene glycol, traZODone    Diagnostic Results   CBC- 2/2/2025: 11:25 AM  5.3 13.2 355    38.5      BMP- 2/2/2025: 11:25 AM  136 24 _ 54   3.5 0.72 13    Estimated Creatinine Clearance: 64.6 mL/min (by C-G formula based on SCr of 0.72 mg/dL).     CA: 9.2 PROTIEN: 7.4 ALT: 30 Total Bili: 1.8 Mg: _   PHOS: _ ALBUMIN: 4.4 AST: 85   Alk Phos: 101      [facility]- 2/2/2025: 11:25 AM  0.9   10.4 39     CV Labs  Troponin I, High Sensitivity "   Date/Time Value Ref Range Status   02/02/2025 02:01 PM 6 0 - 13 ng/L Final   02/02/2025 11:25 AM 6 0 - 13 ng/L Final     BNP   Date/Time Value Ref Range Status   09/18/2023 06:57 PM 23 0 - 99 pg/mL Final     Hemoglobin A1C   Date/Time Value Ref Range Status   09/20/2023 06:42 AM 4.2 % Final     Pertinent Imaging  XR chest 1 view 02/02/2025  No acute findings.         Assessment/Plan   Ms. Gerda Buck is a 69 y.o. female who p/w near syncope and is admitted for metabolic acidosis. PMH includes depression, PTSD, HTN, HLD, and hemorrhoids.    #Metabolic Acidosis  #Hypoglycemia  #Near Syncope w/ Diaphoresis  -Symptoms and electrolyte derangements, possibly in the setting of wt loss injections  -Unable to confirm medication and dose, patient receives at [facility] in [place], last injection 1/22  -Repeat labs in AM  -Check blood glucose q4 x24 hours  -UDS ordered, check CRP/CK/LDH     #HTN  -BP acceptable in current circumstances  -c/w home medications    #HLD  -On statin therapy    #Depression  #PTSD  -Stable on current regiment  -c/w home Wellbutrin and Lexapro  -Trazodone qHS    #Hemorrhoids  -Hydrocortisone rectal cream ordered  -Bowel regimen PRN    GI/VTE PPX: SQ Lovenox  Code Status: Full Code    Chart, medical history, and labs/testing reviewed in detail.   Case and plan of care discussed with Dr. Hui      Disposition: Discharge home once medically cleared and stable, pending improvement in labs      Observation/Internal Med MERISSA  [facility]  02/02/25  3:57 PM  Total time of 45 minutes spent on professional and overall care, with >50% of time dedicated to counseling/coordination of care.    Kailyn Palacios PA-C

## 2025-02-02 NOTE — CARE PLAN
The patient's goals for the shift include  remain comfortable    The clinical goals for the shift include remain free from injury and HDS        Problem: Pain - Adult  Goal: Verbalizes/displays adequate comfort level or baseline comfort level  Outcome: Progressing     Problem: Safety - Adult  Goal: Free from fall injury  Outcome: Progressing     Problem: Discharge Planning  Goal: Discharge to home or other facility with appropriate resources  Outcome: Progressing     Problem: Chronic Conditions and Co-morbidities  Goal: Patient's chronic conditions and co-morbidity symptoms are monitored and maintained or improved  Outcome: Progressing     Problem: Nutrition  Goal: Nutrient intake appropriate for maintaining nutritional needs  Outcome: Progressing

## 2025-02-03 VITALS
HEIGHT: 64 IN | BODY MASS INDEX: 23.05 KG/M2 | RESPIRATION RATE: 17 BRPM | OXYGEN SATURATION: 98 % | WEIGHT: 135 LBS | DIASTOLIC BLOOD PRESSURE: 69 MMHG | SYSTOLIC BLOOD PRESSURE: 134 MMHG | HEART RATE: 89 BPM | TEMPERATURE: 98.8 F

## 2025-02-03 LAB
ANION GAP SERPL CALC-SCNC: 9 MMOL/L (ref 10–20)
ATRIAL RATE: 81 BPM
BACTERIA UR CULT: NORMAL
BUN SERPL-MCNC: 16 MG/DL (ref 6–23)
CALCIUM SERPL-MCNC: 8.3 MG/DL (ref 8.6–10.3)
CHLORIDE SERPL-SCNC: 106 MMOL/L (ref 98–107)
CO2 SERPL-SCNC: 23 MMOL/L (ref 21–32)
CREAT SERPL-MCNC: 0.68 MG/DL (ref 0.5–1.05)
EGFRCR SERPLBLD CKD-EPI 2021: >90 ML/MIN/1.73M*2
GLUCOSE BLD MANUAL STRIP-MCNC: 104 MG/DL (ref 74–99)
GLUCOSE BLD MANUAL STRIP-MCNC: 159 MG/DL (ref 74–99)
GLUCOSE BLD MANUAL STRIP-MCNC: 165 MG/DL (ref 74–99)
GLUCOSE SERPL-MCNC: 138 MG/DL (ref 74–99)
HOLD SPECIMEN: NORMAL
MAGNESIUM SERPL-MCNC: 1.99 MG/DL (ref 1.6–2.4)
P AXIS: 73 DEGREES
P OFFSET: 210 MS
P ONSET: 144 MS
POTASSIUM SERPL-SCNC: 3.4 MMOL/L (ref 3.5–5.3)
PR INTERVAL: 156 MS
Q ONSET: 222 MS
QRS COUNT: 14 BEATS
QRS DURATION: 84 MS
QT INTERVAL: 392 MS
QTC CALCULATION(BAZETT): 455 MS
QTC FREDERICIA: 433 MS
R AXIS: 73 DEGREES
SODIUM SERPL-SCNC: 135 MMOL/L (ref 136–145)
T AXIS: 54 DEGREES
T OFFSET: 418 MS
VENTRICULAR RATE: 81 BPM

## 2025-02-03 PROCEDURE — 83735 ASSAY OF MAGNESIUM: CPT

## 2025-02-03 PROCEDURE — 2500000001 HC RX 250 WO HCPCS SELF ADMINISTERED DRUGS (ALT 637 FOR MEDICARE OP): Performed by: INTERNAL MEDICINE

## 2025-02-03 PROCEDURE — 99239 HOSP IP/OBS DSCHRG MGMT >30: CPT

## 2025-02-03 PROCEDURE — G0378 HOSPITAL OBSERVATION PER HR: HCPCS

## 2025-02-03 PROCEDURE — 2500000001 HC RX 250 WO HCPCS SELF ADMINISTERED DRUGS (ALT 637 FOR MEDICARE OP): Performed by: NURSE PRACTITIONER

## 2025-02-03 PROCEDURE — 36415 COLL VENOUS BLD VENIPUNCTURE: CPT | Performed by: NURSE PRACTITIONER

## 2025-02-03 PROCEDURE — 80048 BASIC METABOLIC PNL TOTAL CA: CPT | Performed by: NURSE PRACTITIONER

## 2025-02-03 PROCEDURE — 2500000004 HC RX 250 GENERAL PHARMACY W/ HCPCS (ALT 636 FOR OP/ED): Performed by: INTERNAL MEDICINE

## 2025-02-03 PROCEDURE — 82947 ASSAY GLUCOSE BLOOD QUANT: CPT

## 2025-02-03 RX ORDER — POTASSIUM CHLORIDE 1.5 G/1.58G
40 POWDER, FOR SOLUTION ORAL ONCE
Status: COMPLETED | OUTPATIENT
Start: 2025-02-03 | End: 2025-02-03

## 2025-02-03 RX ADMIN — METOPROLOL TARTRATE 50 MG: 50 TABLET, FILM COATED ORAL at 12:41

## 2025-02-03 RX ADMIN — ESCITALOPRAM OXALATE 10 MG: 10 TABLET ORAL at 12:41

## 2025-02-03 RX ADMIN — POTASSIUM CHLORIDE 40 MEQ: 1.5 POWDER, FOR SOLUTION ORAL at 12:42

## 2025-02-03 RX ADMIN — BUPROPION HYDROCHLORIDE 300 MG: 150 TABLET, EXTENDED RELEASE ORAL at 12:41

## 2025-02-03 RX ADMIN — AMLODIPINE BESYLATE 10 MG: 10 TABLET ORAL at 12:42

## 2025-02-03 RX ADMIN — SODIUM CHLORIDE, SODIUM LACTATE, POTASSIUM CHLORIDE, CALCIUM CHLORIDE AND DEXTROSE MONOHYDRATE 125 ML/HR: 5; 600; 310; 30; 20 INJECTION, SOLUTION INTRAVENOUS at 02:14

## 2025-02-03 ASSESSMENT — ACTIVITIES OF DAILY LIVING (ADL): LACK_OF_TRANSPORTATION: NO

## 2025-02-03 NOTE — PROGRESS NOTES
02/03/25 0941   [facility] Disability Status   Are you deaf or do you have serious difficulty hearing? N   Are you blind or do you have serious difficulty seeing, even when wearing glasses? N   Because of a physical, mental, or emotional condition, do you have serious difficulty concentrating, remembering, or making decisions? (5 years old or older) N  (dizziness)   Do you have serious difficulty walking or climbing stairs? N   Do you have serious difficulty dressing or bathing? N   Because of a physical, mental, or emotional condition, do you have serious difficulty doing errands alone such as visiting the doctor? N

## 2025-02-03 NOTE — CARE PLAN
The patient's goals for the shift include  remain free from dizziness    The clinical goals for the shift include remain free from injury and HDS

## 2025-02-03 NOTE — PROGRESS NOTES
Pharmacy Medication History Review    Gerda Buck is a 69 y.o. female admitted for Metabolic acidosis. Pharmacy reviewed the patient's [id number] medications and allergies for accuracy.    Reviewed with patient    The list below reflectives the updated PTA list. Please review each medication in order reconciliation for additional clarification and justification.     Prior to Admission Medications   Prescriptions Last Dose Informant Patient Reported? Taking?   NIFEdipine (bulk) 0.2 % in ointment base Past Week  No Yes   Sig: Apply to the affected area twice daily   Patient taking differently: 2 times a day as needed. Apply to the affected area twice daily   amLODIPine (Norvasc) 10 mg tablet 2/1/2025 Morning  No Yes   Sig: Take 1 tablet (10 mg) by mouth once daily.   atorvastatin (Lipitor) 10 mg tablet 2/1/2025 Morning  No Yes   Sig: Take 1 tablet (10 mg) by mouth once daily.   buPROPion XL (Wellbutrin XL) 300 mg 24 hr tablet 2/1/2025 Morning  No Yes   Sig: Take 1 tablet (300 mg) by mouth once daily.   escitalopram (Lexapro) 10 mg tablet 2/1/2025 Morning  No Yes   Sig: Take 1 tablet (10 mg) by mouth once daily.   hydrocortisone (Anusol-HC) 2.5 % rectal cream 2/1/2025 Morning  No Yes   Sig: Insert into the rectum 2 times a day for 14 days.   Patient taking differently: Insert into the rectum 2 times a day as needed.   metoprolol tartrate (Lopressor) 50 mg tablet 2/1/2025 Evening  No Yes   Sig: Take 1 tablet by mouth 2 times a day.   traZODone (Desyrel) 100 mg tablet 2/1/2025 Bedtime  No Yes   Sig: Take 1 tablet (100 mg) by mouth once daily at bedtime.   Patient taking differently: Take 1 tablet (100 mg) by mouth as needed at bedtime.   valACYclovir (Valtrex) 500 mg tablet Unknown  No No   Sig: Take 1 tablet (500 mg) by mouth 2 times a day.   Patient taking differently: Take 1 tablet (500 mg) by mouth 2 times a day as needed. PRN cold sore            LUZ CASTELLANO

## 2025-02-03 NOTE — PROGRESS NOTES
Transitional Care Coordination Progress Note:  Plan per Medical/Surgical team: treatment of dizziness with IV fluids  Status: Observation  Payor source: Indira tobias  Discharge disposition: Home alone   Potential Barriers: vitals & labs normal   ADOD: 2/3/2025  RAMON Gomez RN, BSN Transitional Care Coordinator ED# [phone]      02/03/25 0941   Discharge Planning   Living Arrangements Alone   Support Systems Family members;Friends/neighbors   Assistance Needed IV fluids   Type of Residence Private residence   Number of Stairs to Enter Residence 0   Number of Stairs Within Residence 0   Home or Post Acute Services None   Expected Discharge Disposition Home   Does the patient need discharge transport arranged? No   Financial Resource Strain   How hard is it for you to pay for the very basics like food, housing, medical care, and heating? Not hard   Housing Stability   In the last 12 months, was there a time when you were not able to pay the mortgage or rent on time? N   In the past 12 months, how many times have you moved where you were living? 1   At any time in the past 12 months, were you homeless or living in a shelter (including now)? N   Transportation Needs   In the past 12 months, has lack of transportation kept you from medical appointments or from getting medications? no   In the past 12 months, has lack of transportation kept you from meetings, work, or from getting things needed for daily living? No   Stroke Family Assessment   Stroke Family Assessment Needed No   Intensity of Service   Intensity of Service 0-30 min

## 2025-02-03 NOTE — DISCHARGE SUMMARY
Discharge Diagnosis  Metabolic acidosis    Issues Requiring Follow-Up    #Metabolic Acidosis  #Hypoglycemia  #Near Syncope w/ Diaphoresis  -Symptoms and electrolyte derangements, possibly in the setting of wt loss injections  -Unable to confirm medication and dose, patient receives at [facility] in [place], last injection 1/22  -Glucoses stabilized, anion gap closed, and her symptoms resolved  -DC home with no changes to med regiment, instructed to hld off on any further toujeo/trulicity injections until following up with her PCP.     Subclinical Hyperthyroidism  -TSH .43 but her free T4 is .81.   -FU OP and can decide if want to monitor or pursue more evaluation     Interested in cardiology consult  -Per patient request, wants to see cardiologist, had arrhythmia work up back in 2023 for N/V elevated trop and tachycardia, with negative stress echo.  No apparent follow up afterwards.  Uncertain what her question is, encouraged to bring it up with her PCP.    Discharge Meds     Medication List      CHANGE how you take these medications     hydrocortisone 2.5 % rectal cream; Commonly known as: Anusol-HC; Insert   into the rectum 2 times a day for 14 days.; What changed: when to take   this, reasons to take this   NIFEdipine (bulk) 0.2 % in ointment base; Apply to the affected area   twice daily; What changed: when to take this, reasons to take this     CONTINUE taking these medications     amLODIPine 10 mg tablet; Commonly known as: Norvasc; Take 1 tablet (10   mg) by mouth once daily.   atorvastatin 10 mg tablet; Commonly known as: Lipitor; Take 1 tablet (10   mg) by mouth once daily.   buPROPion  mg 24 hr tablet; Commonly known as: Wellbutrin XL; Take   1 tablet (300 mg) by mouth once daily.   escitalopram 10 mg tablet; Commonly known as: Lexapro; Take 1 tablet (10   mg) by mouth once daily.   metoprolol tartrate 50 mg tablet; Commonly known as: Lopressor; Take 1   tablet by mouth 2 times a day.    traZODone 100 mg tablet; Commonly known as: Desyrel; Take 1 tablet (100   mg) by mouth once daily at bedtime.   valACYclovir 500 mg tablet; Commonly known as: Valtrex; Take 1 tablet   (500 mg) by mouth 2 times a day.     STOP taking these medications     etodolac 200 mg capsule; Commonly known as: Lodine       Test Results Pending At Discharge  Pending Labs       Order Current Status    Urine Culture In process            Hospital Course    Gerda Buck is a 69 y.o. female, with a PMH of depression, PTSD, HTN, HLD, and hemorrhoids, who presented to [facility] ED on 2/2/2025 for near syncope. Patient reports feeling like she was going to pass out today, becoming shaky and diaphoretic. She has been feeling dizzy all morning and has had some intermittent nausea. Patient denies recent fever, chills, chest pain, palpitations, leg edema, SOB, cough, abd pain, urinary sx, bloody vomit or stools, headaches, weakness, or trauma/travel/sick contacts.      ED Course: VSS, EKG nonischemic. Labs notable for hypoglycemia with blood glucose level 54, low bicarb 13, and anion gap of 30. Blood glucose improved with PO intake, received IVF in the ER. VBG then demonstrated metabolic acidosis with a PH of 7.27.  -No concern for infection with normal lactate and WBC, no anemia, UA bland  -Of note, patient is currently on ??Toujeo 2-3x per year for wt loss, dose 60 units   Her sugars improved with maintenance fluids.  As her sugars improved, her anion gap closed, and her symptoms resolved.  She has been medically cleared for DC, DC home and instructing her to hold off on any further doses of  toujeo until she discusses it with her PCP upon discharge.      #Metabolic Acidosis  #Hypoglycemia  #Near Syncope w/ Diaphoresis  -Symptoms and electrolyte derangements, possibly in the setting of wt loss injections  -Unable to confirm medication and dose, patient receives at [facility] in [place], last injection 1/22  -Glucoses  stabilized, anion gap closed, and her symptoms resolved  -DC home with no changes to med regiment, instructed to hld off on any further toujeo/trulicity injections until following up with her PCP.      #HTN  -BP acceptable in current circumstances  -c/w home medications     #HLD  -On statin therapy     #Depression  #PTSD  -Stable on current regiment  -c/w home Wellbutrin and Lexapro  -Trazodone qHS     #Hemorrhoids  -Hydrocortisone rectal cream ordered  -Bowel regimen PRN     DVT Prophylaxis  -lovenox    DC Disposition  -Will DC home with no needs after DC.        Pertinent Physical Exam At Time of Discharge  Physical Exam  Constitutional:       General: She is awake. She is not in acute distress.     Appearance: Normal appearance. She is normal weight.   Cardiovascular:      Rate and Rhythm: Normal rate and regular rhythm.      Pulses:           Radial pulses are 2+ on the right side and 2+ on the left side.        Dorsalis pedis pulses are 2+ on the right side and 2+ on the left side.      Heart sounds: Normal heart sounds, S1 normal and S2 normal.   Pulmonary:      Effort: Pulmonary effort is normal.      Breath sounds: Normal breath sounds.   Abdominal:      General: Abdomen is flat. Bowel sounds are normal.      Palpations: Abdomen is soft.      Tenderness: There is no abdominal tenderness.   Musculoskeletal:      Right lower leg: No edema.      Left lower leg: No edema.   Skin:     General: Skin is warm and dry.   Neurological:      General: No focal deficit present.      Mental Status: She is alert and oriented to person, place, and time. Mental status is at baseline.   Psychiatric:         Attention and Perception: Attention and perception normal.         Mood and Affect: Mood and affect normal.         Speech: Speech normal.         Behavior: Behavior normal. Behavior is cooperative.         Thought Content: Thought content normal.         Cognition and Memory: Cognition and memory normal.         Judgment:  Judgment normal.       Results for orders placed or performed during the hospital encounter of 02/02/25 (from the past 24 hours)   Urinalysis with Reflex Culture and Microscopic   Result Value Ref Range    Color, Urine Light-Yellow Light-Yellow, Yellow, Dark-Yellow    Appearance, Urine Clear Clear    Specific Gravity, Urine 1.017 1.005 - 1.035    pH, Urine 5.5 5.0, 5.5, 6.0, 6.5, 7.0, 7.5, 8.0    Protein, Urine 20 (TRACE) NEGATIVE, 10 (TRACE), 20 (TRACE) mg/dL    Glucose, Urine Normal Normal mg/dL    Blood, Urine NEGATIVE NEGATIVE    Ketones, Urine 80 (3+) (A) NEGATIVE mg/dL    Bilirubin, Urine NEGATIVE NEGATIVE    Urobilinogen, Urine Normal Normal mg/dL    Nitrite, Urine NEGATIVE NEGATIVE    Leukocyte Esterase, Urine 25 Ted/uL (A) NEGATIVE   Extra Urine Gray Tube   Result Value Ref Range    Extra Tube Hold for add-ons.    Microscopic Only, Urine   Result Value Ref Range    WBC, Urine 1-5 1-5, NONE /HPF    RBC, Urine 1-2 NONE, 1-2, 3-5 /HPF    Squamous Epithelial Cells, Urine 1-9 (SPARSE) Reference range not established. /HPF    Mucus, Urine FEW Reference range not established. /LPF   Drug Screen, Urine   Result Value Ref Range    Amphetamine Screen, Urine Presumptive Negative Presumptive Negative    Barbiturate Screen, Urine Presumptive Negative Presumptive Negative    Benzodiazepines Screen, Urine Presumptive Negative Presumptive Negative    Cannabinoid Screen, Urine Presumptive Negative Presumptive Negative    Cocaine Metabolite Screen, Urine Presumptive Negative Presumptive Negative    Fentanyl Screen, Urine Presumptive Negative Presumptive Negative    Opiate Screen, Urine Presumptive Negative Presumptive Negative    Oxycodone Screen, Urine Presumptive Negative Presumptive Negative    PCP Screen, Urine Presumptive Negative Presumptive Negative    Methadone Screen, Urine Presumptive Negative Presumptive Negative   POCT GLUCOSE   Result Value Ref Range    POCT Glucose 60 (L) 74 - 99 mg/dL   POCT GLUCOSE   Result  Value Ref Range    POCT Glucose 79 74 - 99 mg/dL   Troponin, High Sensitivity, 1 Hour   Result Value Ref Range    Troponin I, High Sensitivity 6 0 - 13 ng/L   C-reactive protein   Result Value Ref Range    C-Reactive Protein 0.40 <1.00 mg/dL   Lactate Dehydrogenase   Result Value Ref Range     84 - 246 U/L   Creatine Kinase   Result Value Ref Range    Creatine Kinase 52 0 - 215 U/L   Magnesium   Result Value Ref Range    Magnesium 1.47 (L) 1.60 - 2.40 mg/dL   Comprehensive metabolic panel   Result Value Ref Range    Glucose 130 (H) 74 - 99 mg/dL    Sodium 135 (L) 136 - 145 mmol/L    Potassium 3.7 3.5 - 5.3 mmol/L    Chloride 100 98 - 107 mmol/L    Bicarbonate 15 (L) 21 - 32 mmol/L    Anion Gap 24 (H) 10 - 20 mmol/L    Urea Nitrogen 25 (H) 6 - 23 mg/dL    Creatinine 0.75 0.50 - 1.05 mg/dL    eGFR 87 >60 mL/min/1.73m*2    Calcium 8.1 (L) 8.6 - 10.3 mg/dL    Albumin 3.8 3.4 - 5.0 g/dL    Alkaline Phosphatase 87 33 - 136 U/L    Total Protein 5.8 (L) 6.4 - 8.2 g/dL    AST 66 (H) 9 - 39 U/L    Bilirubin, Total 1.8 (H) 0.0 - 1.2 mg/dL    ALT 27 7 - 45 U/L   TSH with reflex to Free T4 if abnormal   Result Value Ref Range    Thyroid Stimulating Hormone 0.43 (L) 0.44 - 3.98 mIU/L   Thyroxine, Free   Result Value Ref Range    Thyroxine, Free 0.81 0.61 - 1.12 ng/dL   Blood Gas Venous Full Panel   Result Value Ref Range    POCT pH, Venous 7.27 (L) 7.33 - 7.43 pH    POCT pCO2, Venous 29 (L) 41 - 51 mm Hg    POCT pO2, Venous 70 (H) 35 - 45 mm Hg    POCT SO2, Venous 94 (H) 45 - 75 %    POCT Oxy Hemoglobin, Venous 92.2 (H) 45.0 - 75.0 %    POCT Hematocrit Calculated, Venous 35.0 (L) 36.0 - 46.0 %    POCT Sodium, Venous 131 (L) 136 - 145 mmol/L    POCT Potassium, Venous 3.9 3.5 - 5.3 mmol/L    POCT Chloride, Venous 98 98 - 107 mmol/L    POCT Ionized Calicum, Venous 1.11 1.10 - 1.33 mmol/L    POCT Glucose, Venous 134 (H) 74 - 99 mg/dL    POCT Lactate, Venous 2.0 0.4 - 2.0 mmol/L    POCT Base Excess, Venous -12.3 (L) -2.0 - 3.0  mmol/L    POCT HCO3 Calculated, Venous 13.3 (L) 22.0 - 26.0 mmol/L    POCT Hemoglobin, Venous 11.6 (L) 12.0 - 16.0 g/dL    POCT Anion Gap, Venous 24.0 10.0 - 25.0 mmol/L    Patient Temperature 37.0 degrees Celsius    FiO2 21 %   POCT GLUCOSE   Result Value Ref Range    POCT Glucose 147 (H) 74 - 99 mg/dL   POCT GLUCOSE   Result Value Ref Range    POCT Glucose 202 (H) 74 - 99 mg/dL   POCT GLUCOSE   Result Value Ref Range    POCT Glucose 227 (H) 74 - 99 mg/dL   Basic metabolic panel   Result Value Ref Range    Glucose 138 (H) 74 - 99 mg/dL    Sodium 135 (L) 136 - 145 mmol/L    Potassium 3.4 (L) 3.5 - 5.3 mmol/L    Chloride 106 98 - 107 mmol/L    Bicarbonate 23 21 - 32 mmol/L    Anion Gap 9 (L) 10 - 20 mmol/L    Urea Nitrogen 16 6 - 23 mg/dL    Creatinine 0.68 0.50 - 1.05 mg/dL    eGFR >90 >60 mL/min/1.73m*2    Calcium 8.3 (L) 8.6 - 10.3 mg/dL   Magnesium   Result Value Ref Range    Magnesium 1.99 1.60 - 2.40 mg/dL   Lavender Top   Result Value Ref Range    Extra Tube Hold for add-ons.    POCT GLUCOSE   Result Value Ref Range    POCT Glucose 104 (H) 74 - 99 mg/dL       Outpatient Follow-Up  Future Appointments   Date Time Provider Department Center   2/28/2025  1:40 PM Divina Solorzano MD [id number] [facility]       Patient assessed, discussed, and plan of care developed in collaboration with Dr. Kingston Lara, APRN-CNP

## 2025-02-07 ENCOUNTER — HOSPITAL ENCOUNTER (OUTPATIENT)
Dept: CARDIOLOGY | Facility: HOSPITAL | Age: 70
Discharge: HOME | End: 2025-02-07
Payer: MEDICARE

## 2025-02-07 ENCOUNTER — OFFICE VISIT (OUTPATIENT)
Dept: PRIMARY CARE | Facility: CLINIC | Age: 70
End: 2025-02-07
Payer: MEDICARE

## 2025-02-07 ENCOUNTER — OFFICE VISIT (OUTPATIENT)
Dept: CARDIOLOGY | Facility: HOSPITAL | Age: 70
End: 2025-02-07
Payer: MEDICARE

## 2025-02-07 VITALS
BODY MASS INDEX: 22.88 KG/M2 | RESPIRATION RATE: 16 BRPM | HEART RATE: 68 BPM | SYSTOLIC BLOOD PRESSURE: 118 MMHG | OXYGEN SATURATION: 100 % | DIASTOLIC BLOOD PRESSURE: 78 MMHG | HEIGHT: 64 IN | WEIGHT: 134 LBS | TEMPERATURE: 96.8 F

## 2025-02-07 VITALS
HEIGHT: 68 IN | WEIGHT: 138 LBS | BODY MASS INDEX: 20.92 KG/M2 | DIASTOLIC BLOOD PRESSURE: 66 MMHG | HEART RATE: 64 BPM | SYSTOLIC BLOOD PRESSURE: 118 MMHG | OXYGEN SATURATION: 99 %

## 2025-02-07 DIAGNOSIS — I49.8 OTHER CARDIAC ARRHYTHMIA: ICD-10-CM

## 2025-02-07 DIAGNOSIS — R06.02 SHORTNESS OF BREATH: Primary | ICD-10-CM

## 2025-02-07 DIAGNOSIS — E05.90 HYPERTHYROIDISM: Primary | ICD-10-CM

## 2025-02-07 DIAGNOSIS — M54.9 BACK PAIN, UNSPECIFIED BACK LOCATION, UNSPECIFIED BACK PAIN LATERALITY, UNSPECIFIED CHRONICITY: ICD-10-CM

## 2025-02-07 DIAGNOSIS — F51.01 PRIMARY INSOMNIA: ICD-10-CM

## 2025-02-07 DIAGNOSIS — E78.2 MIXED HYPERLIPIDEMIA: ICD-10-CM

## 2025-02-07 DIAGNOSIS — F32.9 MAJOR DEPRESSIVE DISORDER, REMISSION STATUS UNSPECIFIED, UNSPECIFIED WHETHER RECURRENT: ICD-10-CM

## 2025-02-07 DIAGNOSIS — R00.2 PALPITATIONS: ICD-10-CM

## 2025-02-07 LAB — BODY SURFACE AREA: 1.73 M2

## 2025-02-07 PROCEDURE — 3008F BODY MASS INDEX DOCD: CPT | Performed by: INTERNAL MEDICINE

## 2025-02-07 PROCEDURE — 99214 OFFICE O/P EST MOD 30 MIN: CPT | Performed by: INTERNAL MEDICINE

## 2025-02-07 PROCEDURE — 1159F MED LIST DOCD IN RCRD: CPT | Performed by: INTERNAL MEDICINE

## 2025-02-07 PROCEDURE — 1036F TOBACCO NON-USER: CPT | Performed by: INTERNAL MEDICINE

## 2025-02-07 PROCEDURE — 99204 OFFICE O/P NEW MOD 45 MIN: CPT | Performed by: INTERNAL MEDICINE

## 2025-02-07 PROCEDURE — 93005 ELECTROCARDIOGRAM TRACING: CPT | Performed by: INTERNAL MEDICINE

## 2025-02-07 PROCEDURE — 93246 EXT ECG>7D<15D RECORDING: CPT

## 2025-02-07 RX ORDER — ATORVASTATIN CALCIUM 10 MG/1
10 TABLET, FILM COATED ORAL DAILY
Qty: 90 TABLET | Refills: 3 | Status: SHIPPED | OUTPATIENT
Start: 2025-02-07

## 2025-02-07 RX ORDER — AMLODIPINE BESYLATE 10 MG/1
10 TABLET ORAL DAILY
Qty: 90 TABLET | Refills: 3 | Status: SHIPPED | OUTPATIENT
Start: 2025-02-07 | End: 2026-02-02

## 2025-02-07 RX ORDER — BUPROPION HYDROCHLORIDE 300 MG/1
300 TABLET ORAL DAILY
Qty: 90 TABLET | Refills: 3 | Status: SHIPPED | OUTPATIENT
Start: 2025-02-07 | End: 2026-02-02

## 2025-02-07 RX ORDER — ETODOLAC 200 MG/1
200 CAPSULE ORAL 2 TIMES DAILY
Qty: 14 CAPSULE | Refills: 3 | Status: SHIPPED | OUTPATIENT
Start: 2025-02-07

## 2025-02-07 RX ORDER — TRAZODONE HYDROCHLORIDE 100 MG/1
100 TABLET ORAL NIGHTLY
Qty: 90 TABLET | Refills: 3 | Status: SHIPPED | OUTPATIENT
Start: 2025-02-07

## 2025-02-07 ASSESSMENT — ANXIETY QUESTIONNAIRES
5. BEING SO RESTLESS THAT IT IS HARD TO SIT STILL: NOT AT ALL
1. FEELING NERVOUS, ANXIOUS, OR ON EDGE: MORE THAN HALF THE DAYS
7. FEELING AFRAID AS IF SOMETHING AWFUL MIGHT HAPPEN: NOT AT ALL
3. WORRYING TOO MUCH ABOUT DIFFERENT THINGS: NOT AT ALL
GAD7 TOTAL SCORE: 5
2. NOT BEING ABLE TO STOP OR CONTROL WORRYING: MORE THAN HALF THE DAYS
6. BECOMING EASILY ANNOYED OR IRRITABLE: NOT AT ALL
4. TROUBLE RELAXING: SEVERAL DAYS

## 2025-02-07 ASSESSMENT — PATIENT HEALTH QUESTIONNAIRE - PHQ9
SUM OF ALL RESPONSES TO PHQ9 QUESTIONS 1 AND 2: 1
10. IF YOU CHECKED OFF ANY PROBLEMS, HOW DIFFICULT HAVE THESE PROBLEMS MADE IT FOR YOU TO DO YOUR WORK, TAKE CARE OF THINGS AT HOME, OR GET ALONG WITH OTHER PEOPLE: NOT DIFFICULT AT ALL
6. FEELING BAD ABOUT YOURSELF - OR THAT YOU ARE A FAILURE OR HAVE LET YOURSELF OR YOUR FAMILY DOWN: NOT AT ALL
8. MOVING OR SPEAKING SO SLOWLY THAT OTHER PEOPLE COULD HAVE NOTICED. OR THE OPPOSITE, BEING SO FIGETY OR RESTLESS THAT YOU HAVE BEEN MOVING AROUND A LOT MORE THAN USUAL: NOT AT ALL
4. FEELING TIRED OR HAVING LITTLE ENERGY: NOT AT ALL
9. THOUGHTS THAT YOU WOULD BE BETTER OFF DEAD, OR OF HURTING YOURSELF: NOT AT ALL
5. POOR APPETITE OR OVEREATING: NOT AT ALL
SUM OF ALL RESPONSES TO PHQ QUESTIONS 1-9: 1
1. LITTLE INTEREST OR PLEASURE IN DOING THINGS: NOT AT ALL
2. FEELING DOWN, DEPRESSED OR HOPELESS: SEVERAL DAYS
7. TROUBLE CONCENTRATING ON THINGS, SUCH AS READING THE NEWSPAPER OR WATCHING TELEVISION: NOT AT ALL
3. TROUBLE FALLING OR STAYING ASLEEP OR SLEEPING TOO MUCH: NOT AT ALL

## 2025-02-07 ASSESSMENT — ENCOUNTER SYMPTOMS
PALPITATIONS: 1
DIZZINESS: 1

## 2025-02-07 NOTE — PROGRESS NOTES
Subjective   Patient ID: Gerda Buck is a 69 y.o. female who presents for Hospital Follow-up (med), Med Refill, Dizziness, and Palpitations.    In for follow up for. Fatigue.     Med Refill    Dizziness    Palpitations   Associated symptoms include dizziness.       Review of Systems   Cardiovascular:  Positive for palpitations.   Neurological:  Positive for dizziness.       Previous history  Past Medical History:   Diagnosis Date    Other conditions influencing health status     Arthritis    Personal history of other infectious and parasitic diseases     H/O cold sores     Past Surgical History:   Procedure Laterality Date    ANKLE SURGERY  12/27/2013    Ankle Surgery    FOOT SURGERY  12/27/2013    Foot Surgery     Social History     Tobacco Use    Smoking status: Never     Passive exposure: Never    Smokeless tobacco: Never   Substance Use Topics    Alcohol use: Yes     Comment: socially    Drug use: Never     No family history on file.  No Known Allergies  Current Outpatient Medications   Medication Instructions    amLODIPine (NORVASC) 10 mg, oral, Daily    atorvastatin (LIPITOR) 10 mg, oral, Daily    buPROPion XL (WELLBUTRIN XL) 300 mg, oral, Daily    escitalopram (LEXAPRO) 10 mg, oral, Daily RT    etodolac (LODINE) 200 mg, oral, 2 times daily    hydrocortisone (Anusol-HC) 2.5 % rectal cream rectal, 2 times daily    metoprolol tartrate (LOPRESSOR) 50 mg, oral, 2 times daily    NIFEdipine (bulk) 0.2 % in ointment base Apply to the affected area twice daily    traZODone (DESYREL) 100 mg, oral, Nightly    valACYclovir (VALTREX) 500 mg, oral, 2 times daily       Objective       Physical Exam      Assessment/Plan   Gerda Buck is a 69 y.o. female who presents for the concerns below:    Problem List Items Addressed This Visit    None  Visit Diagnoses       Other cardiac arrhythmia        Relevant Medications    amLODIPine (Norvasc) 10 mg tablet    Mixed hyperlipidemia        Relevant Medications     atorvastatin (Lipitor) 10 mg tablet    Major depressive disorder, remission status unspecified, unspecified whether recurrent        Relevant Medications    buPROPion XL (Wellbutrin XL) 300 mg 24 hr tablet    Back pain, unspecified back location, unspecified back pain laterality, unspecified chronicity        Relevant Medications    etodolac (Lodine) 200 mg capsule    Primary insomnia        Relevant Medications    traZODone (Desyrel) 100 mg tablet                 Discussed with:   Return in :    Portions of this note were generated using digital voice recognition software, and may contain grammatical errors       Juan M Larry MD  02/07/25  9:22 AM

## 2025-02-07 NOTE — PROGRESS NOTES
Referred by Dr. Larry for No chief complaint on file.     History Of Present Illness:    Gerda Buck is a 69 y.o. female with complex PMH (detailed below) presenting to outpatient cardiology clinic for palpitations/presyncope.  She was recently seen in the emergency department for the symptoms along with diaphoresis and generalized feeling unwell.  She attributes these to recently starting semaglutide, which is since been discontinued.  Her symptoms have been improving over the last couple days however palpitations continue to be an issue for her and are a cause of some concern.    Denies ever having exertional chest discomfort or shortness of breath, no symptoms involving the left jaw or arm, no changes in abdominal or lower extremity edema.    Past Medical History:  Hypertension/dyslipidemia  PTSD  Depression    Review of Systems   Constitutional:  No Weight Change, No Fever, No Chills, No Night Sweats, No Fatigue, No Malaise   ENT/Mouth:  No Hearing Changes, No Ear Pain, No Nasal Congestion, No  Sinus Pain, No Hoarseness, No sore throat, No Rhinorrhea, No Swallowing  Difficulty   Eyes:  No Eye Pain, No Swelling, No Redness, No Foreign Body, No Discharge, No Vision Changes   Cardiovascular:  See HPI   Respiratory:  No Cough, No Sputum, No Wheezing, No Smoke Exposure, No Dyspnea   Gastrointestinal:  No Nausea, No Vomiting, No Diarrhea, No  Constipation, No Pain, No Heartburn, No Anorexia, No Dysphagia, No  Hematochezia, No Melena, No Flatulence, No Jaundice   Genitourinary:  No Dysmenorrhea, No DUB, No Dyspareunia, No Dysuria, No  Urinary Frequency, No Hematuria, No Urinary Incontinence, No Urgency,  No Flank Pain, No Urinary Flow Changes   Musculoskeletal:  No Arthralgias, No Myalgias, No Joint Swelling, No  Joint Stiffness, No Back Pain, No Neck Pain, No Injury History   Skin:  No Skin Lesions, No Pruritis, No Hair Changes, No Breast/Skin Changes, No Nipple Discharge   Neuro:  No Weakness, No Numbness,  "No Paresthesias, No Loss of  Consciousness, No Syncope, No Dizziness, No Headache, No Coordination  Changes, No Recent Falls   Psych:  No Anxiety/Panic, No Depression, No Insomnia, No Delusions, No Rumination, No SI/HI/AH/VH, No Social Issues,  No Memory Changes, No Violence/Abuse Hx., No Eating Concerns   Heme/Lymph:  No Bruising, No Bleeding, No Transfusions History, No Lymphadenopathy   Endocrine:  No Polyuria, No Polydipsia, No Temperature Intolerance        Past Medical History:  She has a past medical history of Other conditions influencing health status and Personal history of other infectious and parasitic diseases.    Past Surgical History:  She has a past surgical history that includes Foot surgery (12/27/2013) and Ankle surgery (12/27/2013).      Social History:  She reports that she has never smoked. She has never been exposed to tobacco smoke. She has never used smokeless tobacco. She reports current alcohol use. She reports that she does not use drugs.    Family History:  No family history on file.     Allergies:  Patient has no known allergies.    Outpatient Medications:  Current Outpatient Medications   Medication Instructions    amLODIPine (NORVASC) 10 mg, oral, Daily    atorvastatin (LIPITOR) 10 mg, oral, Daily    buPROPion XL (WELLBUTRIN XL) 300 mg, oral, Daily    etodolac (LODINE) 200 mg, oral, 2 times daily    hydrocortisone (Anusol-HC) 2.5 % rectal cream rectal, 2 times daily    NIFEdipine (bulk) 0.2 % in ointment base Apply to the affected area twice daily    traZODone (DESYREL) 100 mg, oral, Nightly    valACYclovir (VALTREX) 500 mg, oral, 2 times daily        Last Recorded Vitals:  Vitals:    02/07/25 1505   BP: 118/66   Pulse: 64   SpO2: 99%   Weight: 62.6 kg (138 lb)   Height: 1.727 m (5' 8\")       Physical Exam:  GEN: calm, cooperative, asking appropriate questions  NEURO: Alert and oriented x3, CN2-12 intact, SILT, Moving all extremities without difficulty  HEENT: atraumatic, no goiter, " "no JVD, normal uvula   CARDS: PMI is non-displaced, RRR, no MRG  PULM: CTAB, no wheezes / rales / rhonchi   ABD: soft, non-distended, non-tender, non-tympanitic, BS in all 4 quadrants. No hepatosplenomegaly  : unremarkable  SKIN: healthy appearing, normal skin turgor   EXT: atraumatic  VASC: b/l radial pulses are brisk and equal            Last Labs:  CBC -  Lab Results   Component Value Date    WBC 5.3 02/02/2025    HGB 13.2 02/02/2025    HCT 38.5 02/02/2025     02/02/2025     02/02/2025       CMP -  Lab Results   Component Value Date    CALCIUM 8.3 (L) 02/03/2025    PHOS 2.6 09/20/2023    PROT 5.8 (L) 02/02/2025    ALBUMIN 3.8 02/02/2025    AST 66 (H) 02/02/2025    ALT 27 02/02/2025    ALKPHOS 87 02/02/2025    BILITOT 1.8 (H) 02/02/2025       LIPID PANEL -   Lab Results   Component Value Date    CHOL 195 05/18/2022    HDL 94.2 05/18/2022    CHHDL 2.1 05/18/2022    VLDL 18 05/18/2022    TRIG 92 05/18/2022    NHDL 161 07/20/2021       RENAL FUNCTION PANEL -   Lab Results   Component Value Date    K 3.4 (L) 02/03/2025    PHOS 2.6 09/20/2023       Lab Results   Component Value Date    BNP 23 09/18/2023    HGBA1C 4.2 09/20/2023           Last Cardiology Tests:    ECG:  Encounter Date: 02/02/25   ECG 12 lead   Result Value    Ventricular Rate 81    Atrial Rate 81    NM Interval 156    QRS Duration 84    QT Interval 392    QTC Calculation(Bazett) 455    P Axis 73    R Axis 73    T Axis 54    QRS Count 14    Q Onset 222    P Onset 144    P Offset 210    T Offset 418    QTC Fredericia 433    Narrative    Normal sinus rhythm  Nonspecific ST abnormality  Abnormal ECG  When compared with ECG of 18-SEP-2023 18:26,  Previous ECG has undetermined rhythm, needs review         Echo:  Echo Results:  No results found for this or any previous visit from the past 365 days.       Ejection Fractions:  No results found for: \"EF\"    Cath:  No results found for this or any previous visit from the past 365 " days.        Stress Test:  Stress Results:  No results found for this or any previous visit from the past 365 days.       Cardiac Imaging:  ECG 12 lead  Normal sinus rhythm  Nonspecific ST abnormality  Abnormal ECG  When compared with ECG of 18-SEP-2023 18:26,  Previous ECG has undetermined rhythm, needs review      Assessment/Plan   69-year-old female here for evaluation management of palpitations complicated by presyncope.  Recent hypoglycemic episode seems to have been caused by starting semaglutide for weight loss that medication has since been discontinued.  Her overall symptoms seem to be improving but the sensation of palpitations continues and is a cause of concern.  We will obtain a 14-day cardiac event monitor and have her follow-up in clinic in approximately 1 to 2 months.  If symptoms progress we will likely move forward with a transthoracic echocardiogram.  The patient wanted to hold off on TTE at this time and would like to await the results of her cardiac event monitor before pursuing additional testing.  ECG obtained in the office today is normal sinus rhythm with no convincing ST segment changes that would suggest ischemia at rest.  Please see detailed problem based assessment / plan below    # palpitations / presyncope  -Event monitor x 14 days  -Follow-up in outpatient clinic approximately 2 months, at which point we will likely obtain a transthoracic echocardiogram.    # Cardiovascular Health Maintenance  - Physical Activity: Encourage 10-15K steps per day  - Healthy Diet: Avoid high fat and high sodium foods (processed meats / fast foods)  --- consider a mediterranean or DASH diet, emphasizing vegetables, fruits, whole grains, lean proteins  - Avoidance of smoking and smoke exposure    Problem List Items Addressed This Visit    None  Visit Diagnoses         Codes    Shortness of breath    -  Primary R06.02    Relevant Orders    ECG 12 lead (Clinic Performed)    Other cardiac arrhythmia     I49.8     Palpitations     R00.2    Relevant Orders    Holter Or Event Cardiac Monitor    Follow Up In Cardiology                Time Spent: I spent 40 minutes reviewing medical testing, obtaining medical history and counselling and educating on diagnosis and documenting clinical encounter.   C. Barton Gillombardo, MD  Interventional Cardiology  Pager: [phone]

## 2025-02-16 LAB
ATRIAL RATE: 64 BPM
P AXIS: 64 DEGREES
P OFFSET: 194 MS
P ONSET: 142 MS
PR INTERVAL: 158 MS
Q ONSET: 221 MS
QRS COUNT: 11 BEATS
QRS DURATION: 80 MS
QT INTERVAL: 380 MS
QTC CALCULATION(BAZETT): 392 MS
QTC FREDERICIA: 388 MS
R AXIS: 34 DEGREES
T AXIS: 37 DEGREES
T OFFSET: 411 MS
VENTRICULAR RATE: 64 BPM

## 2025-02-20 ENCOUNTER — TELEPHONE (OUTPATIENT)
Dept: PRIMARY CARE | Facility: CLINIC | Age: 70
End: 2025-02-20

## 2025-02-25 LAB
ATRIAL RATE: 81 BPM
P AXIS: 73 DEGREES
P OFFSET: 210 MS
P ONSET: 144 MS
PR INTERVAL: 156 MS
Q ONSET: 222 MS
QRS COUNT: 14 BEATS
QRS DURATION: 84 MS
QT INTERVAL: 392 MS
QTC CALCULATION(BAZETT): 455 MS
QTC FREDERICIA: 433 MS
R AXIS: 73 DEGREES
T AXIS: 54 DEGREES
T OFFSET: 418 MS
VENTRICULAR RATE: 81 BPM

## 2025-02-25 NOTE — PROGRESS NOTES
No chief complaint on file.      History Of Present Illness  Gerda Buck is a 69 y.o. female presenting with hemorrhoids and levator ani tenderness.    Subjective   Gerda Buck was referred by Sis Veliz CNP  for evaluation of pain. She was seen by Sis 8/24 and had inflamed internal hemorrhoids and a large external anal skin tag. She was last seen 11/2024 and was given nifedipine and a referral to PFPT.     Pain: Yes - even with sitting, with Bms   Protruding Tissue: Yes  Bleeding: Yes - on TP     Bowel habits:  Moves bowels daily  Stool is soft with fiber; spends 5-6 minutes on the toilet to have a BM  Has blood on TP.     Dietary history:   64oz water; eats some fruit/veg/whole grains. At least 2+ servings per day.       Colonoscopy 3/2024 ([facility]): Hemorrhoids on perianal exam. The examined portion of the ileum was normal. Mild diverticulosis in the sigmoid colon. Medium sized external hemorrhoids. The exam was otherwise normal. Repeat in 10 years.     Non-smoker/No ETOH/No Illicit drug use  PMH: HTN, HLD, osteoporosis  PSH:  Allergies:   No family history of CRC or IBD  Employment:       Past Medical History  Past Medical History:   Diagnosis Date    Other conditions influencing health status     Arthritis    Personal history of other infectious and parasitic diseases     H/O cold sores       Surgical History  Past Surgical History:   Procedure Laterality Date    ANKLE SURGERY  12/27/2013    Ankle Surgery    FOOT SURGERY  12/27/2013    Foot Surgery        Social History  She reports that she has never smoked. She has never been exposed to tobacco smoke. She has never used smokeless tobacco. She reports current alcohol use. She reports that she does not use drugs.    Family History  No family history on file.     Allergies  Patient has no known allergies.    Home Medications  Prior to Admission medications    Medication Sig Start Date End Date Taking? Authorizing Provider   amLODIPine  (Norvasc) 10 mg tablet Take 1 tablet (10 mg) by mouth once daily. 6/5/24 12/2/24  Juan M Larry MD   atorvastatin (Lipitor) 10 mg tablet Take 1 tablet (10 mg) by mouth once daily. 6/5/24   Juan M Larry MD   buPROPion XL (Wellbutrin XL) 300 mg 24 hr tablet Take 1 tablet (300 mg) by mouth once daily. 6/5/24 12/2/24  Juan M Larry MD   escitalopram (Lexapro) 10 mg tablet Take 1 tablet (10 mg) by mouth once daily. 6/5/24 12/2/24  Juan M Larry MD   etodolac (Lodine) 200 mg capsule Take 1 capsule (200 mg) by mouth 2 times a day. 1/9/24   Juan M Larry MD   hydrocortisone (Anusol-HC) 2.5 % rectal cream Insert into the rectum 2 times a day for 14 days. 2/22/24 3/7/24  Doug Nguyen MD   LORazepam (Ativan) 0.5 mg tablet Take 1 tablet (0.5 mg) by mouth once daily. 9/21/23   Juan M Larry MD   metoprolol tartrate (Lopressor) 50 mg tablet Take 1 tablet by mouth 2 times a day. 6/5/24 12/2/24  Juan M Larry MD   traZODone (Desyrel) 100 mg tablet Take 1 tablet (100 mg) by mouth once daily at bedtime. 6/5/24   Juan M Larry MD   valACYclovir (Valtrex) 500 mg tablet Take 1 tablet (500 mg) by mouth 2 times a day. 9/17/24   Juan M Larry MD       Review of Systems     Physical Exam    Perineal/Rectal Exam  Perineum: Anterior midline hemorrhoidal skin tag   MAAME: WNL  Tone: WNL    Procedures       Last Recorded Vitals  There were no vitals taken for this visit.    Relevant Results  {If you would like to pull in Medications, type .meds     If you would like to pull in Lab results for the last 24 hours, type .[email or web address]    If you would like to pull in Imaging results, type .imgrslt :99}        Assessment/Plan

## 2025-02-28 ENCOUNTER — APPOINTMENT (OUTPATIENT)
Dept: SURGERY | Facility: CLINIC | Age: 70
End: 2025-02-28
Payer: MEDICARE

## 2025-03-03 LAB — BODY SURFACE AREA: 1.73 M2

## 2025-04-07 ENCOUNTER — TELEPHONE (OUTPATIENT)
Dept: CARDIOLOGY | Facility: CLINIC | Age: 70
End: 2025-04-07
Payer: MEDICARE

## 2025-04-11 ENCOUNTER — OFFICE VISIT (OUTPATIENT)
Dept: CARDIOLOGY | Facility: HOSPITAL | Age: 70
End: 2025-04-11
Payer: MEDICARE

## 2025-04-11 VITALS
OXYGEN SATURATION: 97 % | DIASTOLIC BLOOD PRESSURE: 67 MMHG | WEIGHT: 138.1 LBS | HEART RATE: 87 BPM | HEIGHT: 64 IN | BODY MASS INDEX: 23.58 KG/M2 | SYSTOLIC BLOOD PRESSURE: 133 MMHG

## 2025-04-11 DIAGNOSIS — R00.2 PALPITATIONS: Primary | ICD-10-CM

## 2025-04-11 PROCEDURE — 3008F BODY MASS INDEX DOCD: CPT | Performed by: INTERNAL MEDICINE

## 2025-04-11 PROCEDURE — 99214 OFFICE O/P EST MOD 30 MIN: CPT | Performed by: INTERNAL MEDICINE

## 2025-04-11 PROCEDURE — 1159F MED LIST DOCD IN RCRD: CPT | Performed by: INTERNAL MEDICINE

## 2025-04-11 RX ORDER — ASPIRIN 81 MG/1
81 TABLET ORAL DAILY
COMMUNITY

## 2025-04-11 RX ORDER — METOPROLOL SUCCINATE 50 MG/1
50 TABLET, EXTENDED RELEASE ORAL DAILY
Qty: 30 TABLET | Refills: 11 | Status: SHIPPED | OUTPATIENT
Start: 2025-04-11 | End: 2026-04-11

## 2025-04-11 ASSESSMENT — ENCOUNTER SYMPTOMS
OCCASIONAL FEELINGS OF UNSTEADINESS: 0
DEPRESSION: 0
LOSS OF SENSATION IN FEET: 0

## 2025-04-11 NOTE — PROGRESS NOTES
Referred by Dr. Navas ref. provider found for No chief complaint on file.     History Of Present Illness:    Gerda Buck is a 69 y.o. female with complex PMH (detailed below) presenting to outpatient cardiology clinic for routine follow-up.  She is an established patient of mine, last seen in the office on 2/7/2025 for palpitations.  We have since obtained a 2-week cardiac event monitor, which showed occasional/transient SVT, with no evidence of atrial fibrillation/flutter or ventricular tachyarrhythmia.    She feels as though her symptoms are slightly improving since our last visit, however when the palpitations occur they are a cause of great concern and she has almost taken herself to the emergency department on a few occasions.    She has been involved in the [place] [employer] business and unfortunately due to climate issues, there will be a dramatic reduction in all of importance to the [place] this year.     Otherwise, the patient reports to clinic in usual state of health, tolerating outpatient med regimen without difficulty and has no acute cardiovascular complaints.  Currently denies all red flag cardiovascular symptoms including no exertional chest discomfort or shortness of breath, no symptoms involving the left jaw or arm, no palpitations, presyncope, syncope, or changes in abdominal or lower extremity edema.  He is able to complete all activities of daily living without limitation. Overall cardiovascular status has remained stable, with no changes in clinical condition in the last several weeks / months.        Past Medical History:  Hypertension/dyslipidemia  PTSD  Depression    Review of Systems   Constitutional:  No Weight Change, No Fever, No Chills, No Night Sweats, No Fatigue, No Malaise   ENT/Mouth:  No Hearing Changes, No Ear Pain, No Nasal Congestion, No  Sinus Pain, No Hoarseness, No sore throat, No Rhinorrhea, No Swallowing  Difficulty   Eyes:  No Eye Pain, No Swelling, No Redness,  No Foreign Body, No Discharge, No Vision Changes   Cardiovascular:  See HPI   Respiratory:  No Cough, No Sputum, No Wheezing, No Smoke Exposure, No Dyspnea   Gastrointestinal:  No Nausea, No Vomiting, No Diarrhea, No  Constipation, No Pain, No Heartburn, No Anorexia, No Dysphagia, No  Hematochezia, No Melena, No Flatulence, No Jaundice   Genitourinary:  No Dysmenorrhea, No DUB, No Dyspareunia, No Dysuria, No  Urinary Frequency, No Hematuria, No Urinary Incontinence, No Urgency,  No Flank Pain, No Urinary Flow Changes   Musculoskeletal:  No Arthralgias, No Myalgias, No Joint Swelling, No  Joint Stiffness, No Back Pain, No Neck Pain, No Injury History   Skin:  No Skin Lesions, No Pruritis, No Hair Changes, No Breast/Skin Changes, No Nipple Discharge   Neuro:  No Weakness, No Numbness, No Paresthesias, No Loss of  Consciousness, No Syncope, No Dizziness, No Headache, No Coordination  Changes, No Recent Falls   Psych:  No Anxiety/Panic, No Depression, No Insomnia, No Delusions, No Rumination, No SI/HI/AH/VH, No Social Issues,  No Memory Changes, No Violence/Abuse Hx., No Eating Concerns   Heme/Lymph:  No Bruising, No Bleeding, No Transfusions History, No Lymphadenopathy   Endocrine:  No Polyuria, No Polydipsia, No Temperature Intolerance        Past Medical History:  She has a past medical history of Other conditions influencing health status and Personal history of other infectious and parasitic diseases.    Past Surgical History:  She has a past surgical history that includes Foot surgery (12/27/2013) and Ankle surgery (12/27/2013).      Social History:  She reports that she has never smoked. She has never been exposed to tobacco smoke. She has never used smokeless tobacco. She reports current alcohol use. She reports that she does not use drugs.    Family History:  No family history on file.     Allergies:  Patient has no known allergies.    Outpatient Medications:  Current Outpatient Medications   Medication  "Instructions    amLODIPine (NORVASC) 10 mg, oral, Daily    aspirin 81 mg, Daily    atorvastatin (LIPITOR) 10 mg, oral, Daily    buPROPion XL (WELLBUTRIN XL) 300 mg, oral, Daily    etodolac (LODINE) 200 mg, oral, 2 times daily    hydrocortisone (Anusol-HC) 2.5 % rectal cream rectal, 2 times daily    metoprolol succinate XL (TOPROL-XL) 50 mg, oral, Daily, Do not crush or chew.    NIFEdipine (bulk) 0.2 % in ointment base Apply to the affected area twice daily    traZODone (DESYREL) 100 mg, oral, Nightly    valACYclovir (VALTREX) 500 mg, oral, 2 times daily        Last Recorded Vitals:  Vitals:    04/11/25 0959   BP: 133/67   Pulse: 87   SpO2: 97%   Weight: 62.6 kg (138 lb 1.6 oz)   Height: 1.626 m (5' 4\")       Physical Exam:  GEN: calm, cooperative, asking appropriate questions  NEURO: Alert and oriented x3, CN2-12 intact, SILT, Moving all extremities without difficulty  HEENT: atraumatic, no goiter, no JVD, normal uvula   CARDS: PMI is non-displaced, RRR, no MRG  PULM: CTAB, no wheezes / rales / rhonchi   ABD: soft, non-distended, non-tender, non-tympanitic, BS in all 4 quadrants. No hepatosplenomegaly  : unremarkable  SKIN: healthy appearing, normal skin turgor   EXT: atraumatic  VASC: b/l radial pulses are brisk and equal            Last Labs:  CBC -  Lab Results   Component Value Date    WBC 5.3 02/02/2025    HGB 13.2 02/02/2025    HCT 38.5 02/02/2025     02/02/2025     02/02/2025       CMP -  Lab Results   Component Value Date    CALCIUM 8.3 (L) 02/03/2025    PHOS 2.6 09/20/2023    PROT 5.8 (L) 02/02/2025    ALBUMIN 3.8 02/02/2025    AST 66 (H) 02/02/2025    ALT 27 02/02/2025    ALKPHOS 87 02/02/2025    BILITOT 1.8 (H) 02/02/2025       LIPID PANEL -   Lab Results   Component Value Date    CHOL 195 05/18/2022    HDL 94.2 05/18/2022    CHHDL 2.1 05/18/2022    VLDL 18 05/18/2022    TRIG 92 05/18/2022    NHDL 161 07/20/2021       RENAL FUNCTION PANEL -   Lab Results   Component Value Date    K 3.4 (L) " "02/03/2025    PHOS 2.6 09/20/2023       Lab Results   Component Value Date    BNP 23 09/18/2023    HGBA1C 4.2 09/20/2023           Last Cardiology Tests:    ECG:  Encounter Date: 02/07/25   ECG 12 lead (Clinic Performed)   Result Value    Ventricular Rate 64    Atrial Rate 64    MI Interval 158    QRS Duration 80    QT Interval 380    QTC Calculation(Bazett) 392    P Axis 64    R Axis 34    T Axis 37    QRS Count 11    Q Onset 221    P Onset 142    P Offset 194    T Offset 411    QTC Fredericia 388    Narrative    Normal sinus rhythm  Normal ECG  When compared with ECG of 02-FEB-2025 10:51, (unconfirmed)  QT has shortened  Confirmed by Abhi Lamb ([id number]) on 2/16/2025 6:14:24 PM         Echo:  Echo Results:  No results found for this or any previous visit from the past 365 days.       Ejection Fractions:  No results found for: \"EF\"    Cath:  No results found for this or any previous visit from the past 365 days.        Stress Test:  Stress Results:  No results found for this or any previous visit from the past 365 days.       Cardiac Imaging:  Holter Or Event Cardiac Monitor  Please refer to scanned images      Assessment/Plan   Is a 61-year-old female here for routine follow-up regarding palpitations.  Recent Holter monitor showed occasional SVT no atrial fibrillation or flutter, no ventricular tachyarrhythmias.  Overall she feels as though she is in her usual state of health, is tolerating her outpatient med regimen without difficulty, and has no acute cardiovascular complaints.  After discussing the risks/benefits/alternatives, we have elected to start low-dose metoprolol succinate today to assess with symptom management.  She will follow-up in outpatient cardiology clinic approximately 2 months to assess responsiveness to therapy.  Please see detailed problem based assessment / plan below    # Palpitations, occasional / transient SVT  - start metoprolol succinate 50  - monitor for responsiveness to therapy  - " follow up in clinic approx 2 months    # HTN   - amlodipine 10    # DLD  - aspirin 81 / atorva 10    # Cardiovascular Health Maintenance  - Physical Activity: Encourage 10-15K steps per day  - Healthy Diet: Avoid high fat and high sodium foods (processed meats / fast foods)  --- consider a mediterranean or DASH diet, emphasizing vegetables, fruits, whole grains, lean proteins  - Avoidance of smoking and smoke exposure    Problem List Items Addressed This Visit    None  Visit Diagnoses         Codes    Palpitations    -  Primary R00.2    Relevant Medications    metoprolol succinate XL (Toprol-XL) 50 mg 24 hr tablet    Other Relevant Orders    Follow Up In Cardiology                Time Spent: I spent 40 minutes reviewing medical testing, obtaining medical history and counselling and educating on diagnosis and documenting clinical encounter.   C. Barton Gillombardo, MD  Interventional Cardiology  Pager: [phone]

## 2025-04-25 ENCOUNTER — APPOINTMENT (OUTPATIENT)
Dept: CARDIOLOGY | Facility: HOSPITAL | Age: 70
End: 2025-04-25
Payer: MEDICARE

## 2025-04-28 NOTE — PROGRESS NOTES
No chief complaint on file.      History Of Present Illness  Gerda Buck is a 69 y.o. female presenting with hemorrhoids.    Subjective   Gerda Buck was referred by Sis Veliz CNP  for evaluation of pain. She was seen by Sis 8/24 and had inflamed internal hemorrhoids and a large external anal skin tag. She was given hydrocortisone suppositories.     Pain: Yes - even with sitting, with Bms   Protruding Tissue: Yes  Bleeding: Yes - on TP     Bowel habits:  Moves bowels daily  Stool is soft with fiber; spends 5-6 minutes on the toilet to have a BM  Has blood on TP.     Dietary history:   64oz water; eats some fruit/veg/whole grains. At least 2+ servings per day.       Colonoscopy 3/2024 ([facility]): Hemorrhoids on perianal exam. The examined portion of the ileum was normal. Mild diverticulosis in the sigmoid colon. Medium sized external hemorrhoids. The exam was otherwise normal. Repeat in 10 years.     Non-smoker/No ETOH/No Illicit drug use  PMH: HTN, HLD, osteoporosis  PSH:  Allergies:   No family history of CRC or IBD  Employment:       Past Medical History  Past Medical History:   Diagnosis Date    Other conditions influencing health status     Arthritis    Personal history of other infectious and parasitic diseases     H/O cold sores       Surgical History  Past Surgical History:   Procedure Laterality Date    ANKLE SURGERY  12/27/2013    Ankle Surgery    FOOT SURGERY  12/27/2013    Foot Surgery        Social History  She reports that she has never smoked. She has never been exposed to tobacco smoke. She has never used smokeless tobacco. She reports current alcohol use. She reports that she does not use drugs.    Family History  No family history on file.     Allergies  Patient has no known allergies.    Home Medications  Prior to Admission medications    Medication Sig Start Date End Date Taking? Authorizing Provider   amLODIPine (Norvasc) 10 mg tablet Take 1 tablet (10 mg) by mouth once  daily. 6/5/24 12/2/24  Juan M Larry MD   atorvastatin (Lipitor) 10 mg tablet Take 1 tablet (10 mg) by mouth once daily. 6/5/24   Juan M Larry MD   buPROPion XL (Wellbutrin XL) 300 mg 24 hr tablet Take 1 tablet (300 mg) by mouth once daily. 6/5/24 12/2/24  Juan M Larry MD   escitalopram (Lexapro) 10 mg tablet Take 1 tablet (10 mg) by mouth once daily. 6/5/24 12/2/24  Juan M Larry MD   etodolac (Lodine) 200 mg capsule Take 1 capsule (200 mg) by mouth 2 times a day. 1/9/24   Juan M Larry MD   hydrocortisone (Anusol-HC) 2.5 % rectal cream Insert into the rectum 2 times a day for 14 days. 2/22/24 3/7/24  Doug Nguyen MD   LORazepam (Ativan) 0.5 mg tablet Take 1 tablet (0.5 mg) by mouth once daily. 9/21/23   Juan M Larry MD   metoprolol tartrate (Lopressor) 50 mg tablet Take 1 tablet by mouth 2 times a day. 6/5/24 12/2/24  Juan M Larry MD   traZODone (Desyrel) 100 mg tablet Take 1 tablet (100 mg) by mouth once daily at bedtime. 6/5/24   Juan M Larry MD   valACYclovir (Valtrex) 500 mg tablet Take 1 tablet (500 mg) by mouth 2 times a day. 9/17/24   Juan M Larry MD       Review of Systems     Physical Exam    Perineal/Rectal Exam  Perineum: Anterior midline hemorrhoidal skin tag   MAAME: WNL  Tone: WNL    ProceduresMildly inflamed internal hemorrhoids, not enlarged. Anterior prolapsing component attached to anterior skin tag.   Levator ani tenderness, particularly on R  Normal tone/good coordination.        Last Recorded Vitals  There were no vitals taken for this visit.    Relevant Results  {If you would like to pull in Medications, type .meds     If you would like to pull in Lab results for the last 24 hours, type .[id number]    If you would like to pull in Imaging results, type .imgrslt :99}        Assessment/Plan   The diagnosis, pathophysiology, treatment and alternatives were discussed in detail. The patient is a candidate for operative hemorrhoidectomy to address the skin  tag/prolapsing tissue. I discussed the procedure, options, preparation, perioperative course, risks and possible outcomes in detail. Specific risks discussed included, but were not limited to, bleeding, infection, wound separation, thrombosis, fistula, sphincter injury and incontinence, pain, spasm, constipation, urinary retention, and the general risks of anesthesia. We discussed the need for maintaining normal bowel habits to prevent fecal impaction in the short term, and then to prevent recurrence in the long term.   Periop preparation, perioperative course, need for a ride home, pain management, recovery and return to work/full activity were discussed. All questions were answered. She is going to think about it and call  With regards to levator ani tenderness, The anatomy, pathophysiology, treatment goals and alternatives, risks and benefits were discussed in detail.  I explained that this is a functional disorder and that treatment is directed at the symptoms.   Options include Nifedipine ointment, heat, anti-spasmodic medications, PT/pelvic floor massage, local injection (steroids, analgesics, botox, etc, per pain management). Patient education materials were provided. I recommend nifedipine, heat, and PT.

## 2025-04-29 ENCOUNTER — APPOINTMENT (OUTPATIENT)
Dept: SURGERY | Facility: CLINIC | Age: 70
End: 2025-04-29
Payer: MEDICARE

## 2025-04-29 ENCOUNTER — APPOINTMENT (OUTPATIENT)
Dept: PRIMARY CARE | Facility: CLINIC | Age: 70
End: 2025-04-29
Payer: MEDICARE

## 2025-04-29 VITALS
DIASTOLIC BLOOD PRESSURE: 50 MMHG | WEIGHT: 137 LBS | TEMPERATURE: 98 F | SYSTOLIC BLOOD PRESSURE: 132 MMHG | OXYGEN SATURATION: 98 % | HEART RATE: 70 BPM | BODY MASS INDEX: 23.52 KG/M2

## 2025-04-29 DIAGNOSIS — R45.86 MOOD CHANGE: Primary | ICD-10-CM

## 2025-04-29 PROCEDURE — 1159F MED LIST DOCD IN RCRD: CPT | Performed by: INTERNAL MEDICINE

## 2025-04-29 PROCEDURE — 1036F TOBACCO NON-USER: CPT | Performed by: INTERNAL MEDICINE

## 2025-04-29 PROCEDURE — 99213 OFFICE O/P EST LOW 20 MIN: CPT | Performed by: INTERNAL MEDICINE

## 2025-04-29 RX ORDER — SERTRALINE HYDROCHLORIDE 50 MG/1
50 TABLET, FILM COATED ORAL DAILY
Qty: 30 TABLET | Refills: 1 | Status: SHIPPED | OUTPATIENT
Start: 2025-04-29 | End: 2025-06-28

## 2025-04-29 ASSESSMENT — ENCOUNTER SYMPTOMS
DEPRESSION: 1
LOSS OF SENSATION IN FEET: 0
OCCASIONAL FEELINGS OF UNSTEADINESS: 0

## 2025-04-29 NOTE — PROGRESS NOTES
Subjective   Patient ID: Gerda Buck is a 69 y.o. female who presents for Anxiety, Depression, and Shaking.    Anxiety        Depression      Review of Systems   Psychiatric/Behavioral:  Positive for depression.        Previous history  Medical History[1]  Surgical History[2]  Social History[3]  Family History[4]  Allergies[5]  Current Outpatient Medications   Medication Instructions    amLODIPine (NORVASC) 10 mg, oral, Daily    aspirin 81 mg, Daily    atorvastatin (LIPITOR) 10 mg, oral, Daily    buPROPion XL (WELLBUTRIN XL) 300 mg, oral, Daily    etodolac (LODINE) 200 mg, oral, 2 times daily    hydrocortisone (Anusol-HC) 2.5 % rectal cream rectal, 2 times daily    metoprolol succinate XL (TOPROL-XL) 50 mg, oral, Daily, Do not crush or chew.    NIFEdipine (bulk) 0.2 % in ointment base Apply to the affected area twice daily    traZODone (DESYREL) 100 mg, oral, Nightly    valACYclovir (VALTREX) 500 mg, oral, 2 times daily       Objective       Physical Exam      Assessment/Plan   Gerda Buck is a 69 y.o. female who presents for the concerns below:    Assessment & Plan  Mood change    Orders:    sertraline (Zoloft) 50 mg tablet; Take 1 tablet (50 mg) by mouth once daily.       Will follow. Call if there are any issues,        Discussed with:   Return in :    Portions of this note were generated using digital voice recognition software, and may contain grammatical errors       Juan M Larry MD  04/29/25  9:32 AM         [1]   Past Medical History:  Diagnosis Date    Other conditions influencing health status     Arthritis    Personal history of other infectious and parasitic diseases     H/O cold sores   [2]   Past Surgical History:  Procedure Laterality Date    ANKLE SURGERY  12/27/2013    Ankle Surgery    FOOT SURGERY  12/27/2013    Foot Surgery   [3]   Social History  Tobacco Use    Smoking status: Never     Passive exposure: Never    Smokeless tobacco: Never   Substance Use Topics    Alcohol use:  Yes     Comment: socially    Drug use: Never   [4] No family history on file.  [5] No Known Allergies

## 2025-05-13 ENCOUNTER — APPOINTMENT (OUTPATIENT)
Dept: PRIMARY CARE | Facility: CLINIC | Age: 70
End: 2025-05-13
Payer: MEDICARE

## 2025-05-14 NOTE — PROGRESS NOTES
"Subjective   Patient ID: Gerda Buck is a 67 y.o. female who presents for Earache.  Earache       In for right ear pain,  Review of Systems   HENT:  Positive for ear pain.    All other systems reviewed and are negative.      Objective   Physical Exam  Constitutional:       Appearance: Normal appearance.   HENT:      Head: Normocephalic.   Eyes:      Extraocular Movements: Extraocular movements intact.      Conjunctiva/sclera: Conjunctivae normal.      Pupils: Pupils are equal, round, and reactive to light.   Cardiovascular:      Rate and Rhythm: Normal rate and regular rhythm.   Pulmonary:      Effort: Pulmonary effort is normal.      Breath sounds: Normal breath sounds.   Abdominal:      General: Abdomen is flat. Bowel sounds are normal.      Palpations: Abdomen is soft.   Musculoskeletal:         General: Normal range of motion.      Cervical back: Normal range of motion.   Skin:     General: Skin is warm and dry.   Neurological:      General: No focal deficit present.      Mental Status: She is alert and oriented to person, place, and time. Mental status is at baseline.   Psychiatric:         Mood and Affect: Mood normal.         Behavior: Behavior normal.       /68 (BP Location: Left arm)   Pulse 81   Temp 36.6 °C (97.8 °F)   Resp 18   Ht 1.626 m (5' 4\")   Wt 67.6 kg (149 lb)   SpO2 97%   BMI 25.58 kg/m²         Assessment/Plan   Problem List Items Addressed This Visit    None  Visit Diagnoses       Impacted cerumen of left ear    -  Primary           Will follow,   " will bring data [phone]

## 2025-05-20 ENCOUNTER — APPOINTMENT (OUTPATIENT)
Dept: PRIMARY CARE | Facility: CLINIC | Age: 70
End: 2025-05-20
Payer: MEDICARE

## 2025-06-04 DIAGNOSIS — K60.2 ANAL FISSURE: Primary | ICD-10-CM

## 2025-06-20 ENCOUNTER — OFFICE VISIT (OUTPATIENT)
Dept: CARDIOLOGY | Facility: HOSPITAL | Age: 70
End: 2025-06-20
Payer: MEDICARE

## 2025-06-20 VITALS
WEIGHT: 293 LBS | HEIGHT: 64 IN | BODY MASS INDEX: 50.02 KG/M2 | SYSTOLIC BLOOD PRESSURE: 150 MMHG | HEART RATE: 74 BPM | OXYGEN SATURATION: 99 % | DIASTOLIC BLOOD PRESSURE: 74 MMHG

## 2025-06-20 DIAGNOSIS — E78.2 MIXED HYPERLIPIDEMIA: ICD-10-CM

## 2025-06-20 DIAGNOSIS — R00.2 PALPITATIONS: ICD-10-CM

## 2025-06-20 DIAGNOSIS — I49.8 OTHER CARDIAC ARRHYTHMIA: ICD-10-CM

## 2025-06-20 PROCEDURE — 3008F BODY MASS INDEX DOCD: CPT | Performed by: INTERNAL MEDICINE

## 2025-06-20 PROCEDURE — 1159F MED LIST DOCD IN RCRD: CPT | Performed by: INTERNAL MEDICINE

## 2025-06-20 PROCEDURE — 1036F TOBACCO NON-USER: CPT | Performed by: INTERNAL MEDICINE

## 2025-06-20 PROCEDURE — 99214 OFFICE O/P EST MOD 30 MIN: CPT | Performed by: INTERNAL MEDICINE

## 2025-06-20 PROCEDURE — 99212 OFFICE O/P EST SF 10 MIN: CPT

## 2025-06-20 RX ORDER — AMLODIPINE BESYLATE 10 MG/1
10 TABLET ORAL DAILY
Qty: 90 TABLET | Refills: 3 | Status: SHIPPED | OUTPATIENT
Start: 2025-06-20 | End: 2026-06-15

## 2025-06-20 RX ORDER — METOPROLOL SUCCINATE 50 MG/1
50 TABLET, EXTENDED RELEASE ORAL DAILY
Qty: 90 TABLET | Refills: 3 | Status: SHIPPED | OUTPATIENT
Start: 2025-06-20 | End: 2026-06-20

## 2025-06-20 RX ORDER — ATORVASTATIN CALCIUM 10 MG/1
10 TABLET, FILM COATED ORAL DAILY
Qty: 90 TABLET | Refills: 3 | Status: SHIPPED | OUTPATIENT
Start: 2025-06-20

## 2025-06-20 NOTE — PROGRESS NOTES
Referred by Dr. Gillombardo for Follow-up     History Of Present Illness:    Gerda Buck is a 69 y.o. female with complex PMH (detailed below) presenting to outpatient cardiology clinic for routine follow up. She was last seen in the office on 4/11/25 at which time we started low dose metoprolol succinate to assist with her palpitations.      The patient reports to clinic in usual state of health, tolerating outpatient med regimen without difficulty and has no acute cardiovascular complaints.  Currently denies all red flag cardiovascular symptoms including no exertional chest discomfort or shortness of breath, no symptoms involving the left jaw or arm, no palpitations, presyncope, syncope, or changes in abdominal or lower extremity edema.  He is able to complete all activities of daily living without limitation. Overall cardiovascular status has remained stable, with no changes in clinical condition in the last several weeks / months.      Past Medical History:  Hypertension/dyslipidemia  PTSD  Depression  Palpitations, now resolved on metop succinate 50mg once daily     Review of Systems   Constitutional:  No Weight Change, No Fever, No Chills, No Night Sweats, No Fatigue, No Malaise   ENT/Mouth:  No Hearing Changes, No Ear Pain, No Nasal Congestion, No  Sinus Pain, No Hoarseness, No sore throat, No Rhinorrhea, No Swallowing  Difficulty   Eyes:  No Eye Pain, No Swelling, No Redness, No Foreign Body, No Discharge, No Vision Changes   Cardiovascular:  See HPI   Respiratory:  No Cough, No Sputum, No Wheezing, No Smoke Exposure, No Dyspnea   Gastrointestinal:  No Nausea, No Vomiting, No Diarrhea, No  Constipation, No Pain, No Heartburn, No Anorexia, No Dysphagia, No  Hematochezia, No Melena, No Flatulence, No Jaundice   Genitourinary:  No Dysmenorrhea, No DUB, No Dyspareunia, No Dysuria, No  Urinary Frequency, No Hematuria, No Urinary Incontinence, No Urgency,  No Flank Pain, No Urinary Flow Changes    Musculoskeletal:  No Arthralgias, No Myalgias, No Joint Swelling, No  Joint Stiffness, No Back Pain, No Neck Pain, No Injury History   Skin:  No Skin Lesions, No Pruritis, No Hair Changes, No Breast/Skin Changes, No Nipple Discharge   Neuro:  No Weakness, No Numbness, No Paresthesias, No Loss of  Consciousness, No Syncope, No Dizziness, No Headache, No Coordination  Changes, No Recent Falls   Psych:  No Anxiety/Panic, No Depression, No Insomnia, No Delusions, No Rumination, No SI/HI/AH/VH, No Social Issues,  No Memory Changes, No Violence/Abuse Hx., No Eating Concerns   Heme/Lymph:  No Bruising, No Bleeding, No Transfusions History, No Lymphadenopathy   Endocrine:  No Polyuria, No Polydipsia, No Temperature Intolerance        Past Medical History:  She has a past medical history of Other conditions influencing health status and Personal history of other infectious and parasitic diseases.    Past Surgical History:  She has a past surgical history that includes Foot surgery (12/27/2013) and Ankle surgery (12/27/2013).      Social History:  She reports that she has never smoked. She has never been exposed to tobacco smoke. She has never used smokeless tobacco. She reports current alcohol use. She reports that she does not use drugs.    Family History:  Family History[1]     Allergies:  Patient has no known allergies.    Outpatient Medications:  Current Outpatient Medications   Medication Instructions    amLODIPine (NORVASC) 10 mg, oral, Daily    aspirin 81 mg, Daily    atorvastatin (LIPITOR) 10 mg, oral, Daily    hydrocortisone (Anusol-HC) 2.5 % rectal cream rectal, 2 times daily    metoprolol succinate XL (TOPROL-XL) 50 mg, oral, Daily, Do not crush or chew.    NIFEdipine (bulk) 0.2 % in ointment base Apply to the affected area twice daily    traZODone (DESYREL) 100 mg, oral, Nightly        Last Recorded Vitals:  Vitals:    06/20/25 1004   BP: 150/74   Pulse: 74   SpO2: 99%   Weight: 138 kg (303 lb 9.2 oz)   Height:  "1.626 m (5' 4\")       Physical Exam:  GEN: calm, cooperative, asking appropriate questions  NEURO: Alert and oriented x3, CN2-12 intact, SILT, Moving all extremities without difficulty  HEENT: atraumatic, no goiter, no JVD, normal uvula   CARDS: PMI is non-displaced, RRR, no MRG  PULM: CTAB, no wheezes / rales / rhonchi   ABD: soft, non-distended, non-tender, non-tympanitic, BS in all 4 quadrants. No hepatosplenomegaly  : unremarkable  SKIN: healthy appearing, normal skin turgor   EXT: atraumatic  VASC: b/l radial pulses are brisk and equal            Last Labs:  CBC -  Lab Results   Component Value Date    WBC 5.3 02/02/2025    HGB 13.2 02/02/2025    HCT 38.5 02/02/2025     02/02/2025     02/02/2025       CMP -  Lab Results   Component Value Date    CALCIUM 8.3 (L) 02/03/2025    PHOS 2.6 09/20/2023    PROT 5.8 (L) 02/02/2025    ALBUMIN 3.8 02/02/2025    AST 66 (H) 02/02/2025    ALT 27 02/02/2025    ALKPHOS 87 02/02/2025    BILITOT 1.8 (H) 02/02/2025       LIPID PANEL -   Lab Results   Component Value Date    CHOL 195 05/18/2022    HDL 94.2 05/18/2022    CHHDL 2.1 05/18/2022    VLDL 18 05/18/2022    TRIG 92 05/18/2022    NHDL 161 07/20/2021       RENAL FUNCTION PANEL -   Lab Results   Component Value Date    K 3.4 (L) 02/03/2025    PHOS 2.6 09/20/2023       Lab Results   Component Value Date    BNP 23 09/18/2023    HGBA1C 4.2 09/20/2023           Last Cardiology Tests:    ECG:  Encounter Date: 02/07/25   ECG 12 lead (Clinic Performed)   Result Value    Ventricular Rate 64    Atrial Rate 64    NC Interval 158    QRS Duration 80    QT Interval 380    QTC Calculation(Bazett) 392    P Axis 64    R Axis 34    T Axis 37    QRS Count 11    Q Onset 221    P Onset 142    P Offset 194    T Offset 411    QTC Fredericia 388    Narrative    Normal sinus rhythm  Normal ECG  When compared with ECG of 02-FEB-2025 10:51, (unconfirmed)  QT has shortened  Confirmed by Abhi Lamb ([id number]) on 2/16/2025 6:14:24 PM " "        Echo:  Echo Results:  No results found for this or any previous visit from the past 365 days.       Ejection Fractions:  No results found for: \"EF\"    Cath:  No results found for this or any previous visit from the past 365 days.        Stress Test:  Stress Results:  No results found for this or any previous visit from the past 365 days.       Cardiac Imaging:  Holter Or Event Cardiac Monitor  Please refer to scanned images      Assessment/Plan   This is a clinically stable 69-year-old female, established patient of mine here for routine follow-up.  Her symptoms of palpitations have completely resolved with low-dose metoprolol succinate, which we will continue.  She is overall doing very well, tolerating her outpatient med regimen without difficulty and has no acute cardiovascular complaints at this time.  Our plan will be for routine follow-up in approximately 6 months or sooner if needed.  Please see detailed problem based assessment / plan below    # Palpitations, occasional / transient SVT  - resolved with metoprolol succinate 50  - follow up 6 months     # HTN   - amlodipine 10     # DLD  - aspirin 81 / atorva 10  # Cardiovascular Health Maintenance  - Physical Activity: Encourage 10-15K steps per day  - Healthy Diet: Avoid high fat and high sodium foods (processed meats / fast foods)  --- consider a mediterranean or DASH diet, emphasizing vegetables, fruits, whole grains, lean proteins  - Avoidance of smoking and smoke exposure    Problem List Items Addressed This Visit    None  Visit Diagnoses         Codes      Palpitations     R00.2    Relevant Medications    metoprolol succinate XL (Toprol-XL) 50 mg 24 hr tablet    Other Relevant Orders    Follow Up In Cardiology      Mixed hyperlipidemia     E78.2    Relevant Medications    atorvastatin (Lipitor) 10 mg tablet    Other Relevant Orders    Follow Up In Cardiology      Other cardiac arrhythmia     I49.8    Relevant Medications    metoprolol succinate " XL (Toprol-XL) 50 mg 24 hr tablet    amLODIPine (Norvasc) 10 mg tablet    Other Relevant Orders    Follow Up In Cardiology                Time Spent: I spent 40 minutes reviewing medical testing, obtaining medical history and counselling and educating on diagnosis and documenting clinical encounter.   C. Barton Gillombardo, MD  Interventional Cardiology  Pager: [phone]       [1] No family history on file.

## 2025-06-30 ENCOUNTER — TELEPHONE (OUTPATIENT)
Dept: SURGERY | Facility: HOSPITAL | Age: 70
End: 2025-06-30

## 2025-06-30 DIAGNOSIS — F32.9 MAJOR DEPRESSIVE DISORDER, REMISSION STATUS UNSPECIFIED, UNSPECIFIED WHETHER RECURRENT: Primary | ICD-10-CM

## 2025-06-30 RX ORDER — SERTRALINE HYDROCHLORIDE 50 MG/1
50 TABLET, FILM COATED ORAL DAILY
COMMUNITY
End: 2025-06-30 | Stop reason: SDUPTHER

## 2025-07-01 ENCOUNTER — APPOINTMENT (OUTPATIENT)
Dept: ENDOCRINOLOGY | Facility: CLINIC | Age: 70
End: 2025-07-01
Payer: MEDICARE

## 2025-07-01 RX ORDER — SERTRALINE HYDROCHLORIDE 50 MG/1
50 TABLET, FILM COATED ORAL DAILY
Qty: 30 TABLET | Refills: 0 | Status: SHIPPED | OUTPATIENT
Start: 2025-07-01

## 2025-07-29 DIAGNOSIS — R00.2 PALPITATIONS: ICD-10-CM

## 2025-07-30 DIAGNOSIS — F32.9 MAJOR DEPRESSIVE DISORDER, REMISSION STATUS UNSPECIFIED, UNSPECIFIED WHETHER RECURRENT: ICD-10-CM

## 2025-07-30 RX ORDER — SERTRALINE HYDROCHLORIDE 50 MG/1
50 TABLET, FILM COATED ORAL DAILY
Qty: 30 TABLET | Refills: 1 | Status: CANCELLED | OUTPATIENT
Start: 2025-07-30

## 2025-07-30 RX ORDER — METOPROLOL SUCCINATE 50 MG/1
50 TABLET, EXTENDED RELEASE ORAL DAILY
Qty: 90 TABLET | Refills: 3 | Status: SHIPPED | OUTPATIENT
Start: 2025-07-30 | End: 2026-07-30

## 2025-08-19 ENCOUNTER — OFFICE VISIT (OUTPATIENT)
Dept: SURGERY | Facility: CLINIC | Age: 70
End: 2025-08-19
Payer: MEDICARE

## 2025-08-19 VITALS
WEIGHT: 140 LBS | DIASTOLIC BLOOD PRESSURE: 65 MMHG | SYSTOLIC BLOOD PRESSURE: 153 MMHG | OXYGEN SATURATION: 99 % | HEART RATE: 73 BPM | BODY MASS INDEX: 24.03 KG/M2

## 2025-08-19 DIAGNOSIS — D64.9 ANEMIA, UNSPECIFIED TYPE: Primary | ICD-10-CM

## 2025-08-19 DIAGNOSIS — K64.4 ANAL SKIN TAG: ICD-10-CM

## 2025-08-19 PROCEDURE — 46600 DIAGNOSTIC ANOSCOPY SPX: CPT | Performed by: SURGERY

## 2025-08-19 PROCEDURE — 99214 OFFICE O/P EST MOD 30 MIN: CPT | Performed by: SURGERY

## 2025-08-19 PROCEDURE — 99214 OFFICE O/P EST MOD 30 MIN: CPT | Mod: 24 | Performed by: SURGERY

## 2025-08-19 PROCEDURE — 1159F MED LIST DOCD IN RCRD: CPT | Performed by: SURGERY

## 2025-08-19 ASSESSMENT — ENCOUNTER SYMPTOMS
RECTAL BLEEDING: 1
RECTAL PAIN: 1
ANAL BLEEDING: 1
FATIGUE: 0
ACTIVITY CHANGE: 0
FEVER: 0
CHILLS: 0
DIARRHEA: 1

## 2025-08-20 RX ORDER — CHOLECALCIFEROL (VITAMIN D3) 25 MCG
25 TABLET ORAL DAILY
COMMUNITY

## 2025-09-02 ENCOUNTER — APPOINTMENT (OUTPATIENT)
Dept: RADIOLOGY | Facility: CLINIC | Age: 70
End: 2025-09-02
Payer: MEDICARE

## 2025-09-03 ENCOUNTER — APPOINTMENT (OUTPATIENT)
Dept: RADIOLOGY | Facility: CLINIC | Age: 70
End: 2025-09-03
Payer: MEDICARE

## 2025-09-03 ENCOUNTER — HOSPITAL ENCOUNTER (OUTPATIENT)
Dept: RADIOLOGY | Facility: CLINIC | Age: 70
Discharge: HOME | End: 2025-09-03
Payer: MEDICARE

## 2025-09-03 DIAGNOSIS — M85.80 OTHER SPECIFIED DISORDERS OF BONE DENSITY AND STRUCTURE, UNSPECIFIED SITE: ICD-10-CM

## 2025-09-03 PROCEDURE — 77080 DXA BONE DENSITY AXIAL: CPT | Performed by: RADIOLOGY

## 2025-09-03 PROCEDURE — 77080 DXA BONE DENSITY AXIAL: CPT

## 2025-09-04 ENCOUNTER — ANESTHESIA EVENT (OUTPATIENT)
Dept: OPERATING ROOM | Facility: CLINIC | Age: 70
End: 2025-09-04
Payer: MEDICARE

## 2025-09-05 ENCOUNTER — ANESTHESIA (OUTPATIENT)
Dept: OPERATING ROOM | Facility: CLINIC | Age: 70
End: 2025-09-05
Payer: MEDICARE

## 2025-09-05 ENCOUNTER — HOSPITAL ENCOUNTER (OUTPATIENT)
Facility: CLINIC | Age: 70
Setting detail: OUTPATIENT SURGERY
Discharge: HOME | End: 2025-09-05
Attending: SURGERY | Admitting: SURGERY
Payer: MEDICARE

## 2025-09-05 VITALS
TEMPERATURE: 96.8 F | OXYGEN SATURATION: 100 % | HEIGHT: 64 IN | HEART RATE: 68 BPM | BODY MASS INDEX: 23.22 KG/M2 | DIASTOLIC BLOOD PRESSURE: 57 MMHG | RESPIRATION RATE: 14 BRPM | SYSTOLIC BLOOD PRESSURE: 123 MMHG | WEIGHT: 136.02 LBS

## 2025-09-05 DIAGNOSIS — K64.4 ANAL SKIN TAG: ICD-10-CM

## 2025-09-05 DIAGNOSIS — D64.9 ANEMIA, UNSPECIFIED TYPE: Primary | ICD-10-CM

## 2025-09-05 PROBLEM — I10 HTN (HYPERTENSION): Status: ACTIVE | Noted: 2025-09-05

## 2025-09-05 PROBLEM — E78.5 HYPERLIPIDEMIA: Status: ACTIVE | Noted: 2025-09-05

## 2025-09-05 PROCEDURE — 2500000004 HC RX 250 GENERAL PHARMACY W/ HCPCS (ALT 636 FOR OP/ED): Performed by: ANESTHESIOLOGIST ASSISTANT

## 2025-09-05 PROCEDURE — 46230 REMOVAL OF ANAL TAGS: CPT | Performed by: SURGERY

## 2025-09-05 PROCEDURE — 3600000007 HC OR TIME - EACH INCREMENTAL 1 MINUTE - PROCEDURE LEVEL TWO: Performed by: SURGERY

## 2025-09-05 PROCEDURE — 7100000010 HC PHASE TWO TIME - EACH INCREMENTAL 1 MINUTE: Performed by: SURGERY

## 2025-09-05 PROCEDURE — 3700000002 HC GENERAL ANESTHESIA TIME - EACH INCREMENTAL 1 MINUTE: Performed by: SURGERY

## 2025-09-05 PROCEDURE — 7100000001 HC RECOVERY ROOM TIME - INITIAL BASE CHARGE: Performed by: SURGERY

## 2025-09-05 PROCEDURE — 2500000005 HC RX 250 GENERAL PHARMACY W/O HCPCS: Performed by: SURGERY

## 2025-09-05 PROCEDURE — 3600000002 HC OR TIME - INITIAL BASE CHARGE - PROCEDURE LEVEL TWO: Performed by: SURGERY

## 2025-09-05 PROCEDURE — 7100000002 HC RECOVERY ROOM TIME - EACH INCREMENTAL 1 MINUTE: Performed by: SURGERY

## 2025-09-05 PROCEDURE — 7100000009 HC PHASE TWO TIME - INITIAL BASE CHARGE: Performed by: SURGERY

## 2025-09-05 PROCEDURE — 3700000001 HC GENERAL ANESTHESIA TIME - INITIAL BASE CHARGE: Performed by: SURGERY

## 2025-09-05 RX ORDER — SODIUM CHLORIDE, SODIUM LACTATE, POTASSIUM CHLORIDE, CALCIUM CHLORIDE 600; 310; 30; 20 MG/100ML; MG/100ML; MG/100ML; MG/100ML
100 INJECTION, SOLUTION INTRAVENOUS CONTINUOUS
Status: DISCONTINUED | OUTPATIENT
Start: 2025-09-05 | End: 2025-09-05 | Stop reason: HOSPADM

## 2025-09-05 RX ORDER — MIDAZOLAM HYDROCHLORIDE 1 MG/ML
INJECTION, SOLUTION INTRAMUSCULAR; INTRAVENOUS AS NEEDED
Status: DISCONTINUED | OUTPATIENT
Start: 2025-09-05 | End: 2025-09-05

## 2025-09-05 RX ORDER — ONDANSETRON HYDROCHLORIDE 2 MG/ML
INJECTION, SOLUTION INTRAVENOUS AS NEEDED
Status: DISCONTINUED | OUTPATIENT
Start: 2025-09-05 | End: 2025-09-05

## 2025-09-05 RX ORDER — OXYCODONE HYDROCHLORIDE 5 MG/1
5 TABLET ORAL EVERY 4 HOURS PRN
Status: DISCONTINUED | OUTPATIENT
Start: 2025-09-05 | End: 2025-09-05 | Stop reason: HOSPADM

## 2025-09-05 RX ORDER — FENTANYL CITRATE 50 UG/ML
INJECTION, SOLUTION INTRAMUSCULAR; INTRAVENOUS AS NEEDED
Status: DISCONTINUED | OUTPATIENT
Start: 2025-09-05 | End: 2025-09-05

## 2025-09-05 RX ORDER — ACETAMINOPHEN 325 MG/1
650 TABLET ORAL EVERY 4 HOURS PRN
Status: DISCONTINUED | OUTPATIENT
Start: 2025-09-05 | End: 2025-09-05 | Stop reason: HOSPADM

## 2025-09-05 RX ORDER — PROPOFOL 10 MG/ML
INJECTION, EMULSION INTRAVENOUS AS NEEDED
Status: DISCONTINUED | OUTPATIENT
Start: 2025-09-05 | End: 2025-09-05

## 2025-09-05 RX ORDER — SODIUM CHLORIDE, SODIUM LACTATE, POTASSIUM CHLORIDE, CALCIUM CHLORIDE 600; 310; 30; 20 MG/100ML; MG/100ML; MG/100ML; MG/100ML
INJECTION, SOLUTION INTRAVENOUS CONTINUOUS PRN
Status: DISCONTINUED | OUTPATIENT
Start: 2025-09-05 | End: 2025-09-05

## 2025-09-05 RX ORDER — SODIUM CHLORIDE 0.9 G/100ML
INJECTION, SOLUTION IRRIGATION AS NEEDED
Status: DISCONTINUED | OUTPATIENT
Start: 2025-09-05 | End: 2025-09-05 | Stop reason: HOSPADM

## 2025-09-05 RX ORDER — FENTANYL CITRATE 50 UG/ML
50 INJECTION, SOLUTION INTRAMUSCULAR; INTRAVENOUS EVERY 5 MIN PRN
Status: DISCONTINUED | OUTPATIENT
Start: 2025-09-05 | End: 2025-09-05 | Stop reason: HOSPADM

## 2025-09-05 RX ORDER — LABETALOL HYDROCHLORIDE 5 MG/ML
5 INJECTION, SOLUTION INTRAVENOUS ONCE AS NEEDED
Status: DISCONTINUED | OUTPATIENT
Start: 2025-09-05 | End: 2025-09-05 | Stop reason: HOSPADM

## 2025-09-05 RX ORDER — LIDOCAINE HYDROCHLORIDE 10 MG/ML
0.1 INJECTION, SOLUTION EPIDURAL; INFILTRATION; INTRACAUDAL; PERINEURAL ONCE
Status: DISCONTINUED | OUTPATIENT
Start: 2025-09-05 | End: 2025-09-05 | Stop reason: HOSPADM

## 2025-09-05 RX ORDER — BUPIVACAINE HYDROCHLORIDE AND EPINEPHRINE 5; 5 MG/ML; UG/ML
INJECTION, SOLUTION EPIDURAL; INTRACAUDAL; PERINEURAL AS NEEDED
Status: DISCONTINUED | OUTPATIENT
Start: 2025-09-05 | End: 2025-09-05 | Stop reason: HOSPADM

## 2025-09-05 RX ORDER — TRAMADOL HYDROCHLORIDE 50 MG/1
50 TABLET, FILM COATED ORAL EVERY 8 HOURS PRN
Qty: 10 TABLET | Refills: 0 | Status: SHIPPED | OUTPATIENT
Start: 2025-09-05

## 2025-09-05 RX ORDER — FENTANYL CITRATE 50 UG/ML
25 INJECTION, SOLUTION INTRAMUSCULAR; INTRAVENOUS EVERY 5 MIN PRN
Status: DISCONTINUED | OUTPATIENT
Start: 2025-09-05 | End: 2025-09-05 | Stop reason: HOSPADM

## 2025-09-05 RX ORDER — ONDANSETRON HYDROCHLORIDE 2 MG/ML
4 INJECTION, SOLUTION INTRAVENOUS ONCE AS NEEDED
Status: DISCONTINUED | OUTPATIENT
Start: 2025-09-05 | End: 2025-09-05 | Stop reason: HOSPADM

## 2025-09-05 RX ADMIN — MIDAZOLAM 2 MG: 1 INJECTION INTRAMUSCULAR; INTRAVENOUS at 09:15

## 2025-09-05 RX ADMIN — DEXAMETHASONE SODIUM PHOSPHATE 4 MG: 4 INJECTION, SOLUTION INTRAMUSCULAR; INTRAVENOUS at 09:23

## 2025-09-05 RX ADMIN — SODIUM CHLORIDE, POTASSIUM CHLORIDE, SODIUM LACTATE AND CALCIUM CHLORIDE: 600; 310; 30; 20 INJECTION, SOLUTION INTRAVENOUS at 09:13

## 2025-09-05 RX ADMIN — PROPOFOL 200 MG: 10 INJECTION, EMULSION INTRAVENOUS at 09:23

## 2025-09-05 RX ADMIN — FENTANYL CITRATE 100 MCG: 50 INJECTION, SOLUTION INTRAMUSCULAR; INTRAVENOUS at 09:23

## 2025-09-05 RX ADMIN — ONDANSETRON 4 MG: 2 INJECTION, SOLUTION INTRAMUSCULAR; INTRAVENOUS at 09:23

## 2025-09-05 SDOH — HEALTH STABILITY: MENTAL HEALTH: CURRENT SMOKER: 0

## 2025-09-05 ASSESSMENT — PAIN SCALES - GENERAL
PAINLEVEL_OUTOF10: 0 - NO PAIN

## 2025-09-05 ASSESSMENT — PAIN - FUNCTIONAL ASSESSMENT
PAIN_FUNCTIONAL_ASSESSMENT: 0-10

## 2025-09-09 ENCOUNTER — APPOINTMENT (OUTPATIENT)
Dept: RADIOLOGY | Facility: CLINIC | Age: 70
End: 2025-09-09
Payer: MEDICARE

## 2025-12-05 ENCOUNTER — APPOINTMENT (OUTPATIENT)
Dept: CARDIOLOGY | Facility: HOSPITAL | Age: 70
End: 2025-12-05
Payer: MEDICARE